# Patient Record
Sex: FEMALE | Employment: FULL TIME | URBAN - METROPOLITAN AREA
[De-identification: names, ages, dates, MRNs, and addresses within clinical notes are randomized per-mention and may not be internally consistent; named-entity substitution may affect disease eponyms.]

---

## 2017-04-24 ENCOUNTER — HOSPITAL ENCOUNTER (EMERGENCY)
Facility: CLINIC | Age: 62
Discharge: HOME | End: 2017-04-24
Attending: CLINICAL NURSE SPECIALIST | Admitting: CLINICAL NURSE SPECIALIST

## 2017-04-24 ENCOUNTER — APPOINTMENT (OUTPATIENT)
Dept: GENERAL RADIOLOGY | Facility: CLINIC | Age: 62
End: 2017-04-24
Attending: CLINICAL NURSE SPECIALIST

## 2017-04-24 VITALS
SYSTOLIC BLOOD PRESSURE: 142 MMHG | OXYGEN SATURATION: 96 % | WEIGHT: 190 LBS | BODY MASS INDEX: 43.97 KG/M2 | DIASTOLIC BLOOD PRESSURE: 62 MMHG | RESPIRATION RATE: 16 BRPM | HEART RATE: 94 BPM | HEIGHT: 55 IN | TEMPERATURE: 98.6 F

## 2017-04-24 DIAGNOSIS — M25.561 ACUTE PAIN OF RIGHT KNEE: ICD-10-CM

## 2017-04-24 PROCEDURE — 73560 X-RAY EXAM OF KNEE 1 OR 2: CPT | Mod: RT

## 2017-04-24 PROCEDURE — 29505 APPLICATION LONG LEG SPLINT: CPT | Mod: RT

## 2017-04-24 PROCEDURE — 99284 EMERGENCY DEPT VISIT MOD MDM: CPT | Mod: 25

## 2017-04-24 RX ORDER — HYDROCODONE BITARTRATE AND ACETAMINOPHEN 5; 325 MG/1; MG/1
1 TABLET ORAL EVERY 6 HOURS PRN
Qty: 16 TABLET | Refills: 0 | Status: ON HOLD | OUTPATIENT
Start: 2017-04-24 | End: 2021-09-10

## 2017-04-24 ASSESSMENT — ENCOUNTER SYMPTOMS
WOUND: 0
NUMBNESS: 0
COLOR CHANGE: 0
NAUSEA: 0
ARTHRALGIAS: 1
WEAKNESS: 0

## 2017-04-24 NOTE — ED AVS SNAPSHOT
Emergency Department    [address] [facility] [address]    Phone:  [phone]    Fax:  [phone]                                       Irma Linn   MRN: [id number]    Department:   Emergency Department   Date of Visit:  4/24/2017           Patient Information     Date Of Birth          1955        Your diagnoses for this visit were:     Acute pain of right knee        You were seen by Iraida Hughes, APRN CNP.      Follow-up Information     Follow up with [facility] In 4 days.    Contact information:    [address] [place] [address]  [phone]          Discharge Instructions         Dolor En La Rodilla [Knee Pain, Uncertain Cause]    La rodilla puede doler por varias causas comunes. Puede tratarse de un esguince (distención [sprain]) de los ligamentos que sostienen la articulación; de constantin lesión en el recubrimiento cartilaginoso que cubre la articulación; de artritis (arthritis) provocada por el desgaste de la articulación; de inflamación (inflammation); o de alguna otra causa. También puede liliana hinchazón, howard movimiento en la articulación de la rodilla y dolor al caminar. Hoy no pudimos realizar un diagnóstico definitivo. Si los síntomas no se alivian, es posible que haya que hacer otras pruebas y controles.  Cuidados En La [place]:  1. En lo posible, no se toque ni use la pierna lesionada hasta que disminuya el dolor.  2. Coloque constantin compresa de hielo (cubos de hielo en constantin bolsa plástica, envuelta en constantin toalla) sobre la bob afectada jessica 20 minutos cada 1 ó 2 horas el primer día. Siga aplicándose compresas de hielo de 3 a 4 veces al día los próximos dos días y, luego, según lo necesite para aliviar el dolor y la hinchazón.  3. Puede usar acetaminofén (acetaminophen) (Tylenol) o ibuprofeno (ibuprofen) (Motrin o Advil) para controlar el dolor, a menos que le hayan recetado otro calmante (analgésico [pain medicine]). [NOTA: Si tiene constantin  enfermedad hepática o renal crónica (chronic liver or kidney disease), o ha tenido alguna vez constantin úlcera estomacal (stomach ulcer) o sangrado gastrointestinal (GI bleeding), consulte con sanders médico antes de lyubov estos medicamentos.]  4. Si le recomendaron utilizar MULETAS (CRUTCHES) o constantin andadera (walker), no apoye todo el peso sobre la pierna lesionada hasta que pueda hacerlo sin sentir dolor. Consulte con el médico antes de volver a hacer deporte o regresar plenamente a odin tareas laborales.  5. Si le colocaron un INMOVILIZADOR DE RODILLA CON VELCRO (VELCRO KNEE BRACE):    Puede abrir el inmovilizador (soporte) para colocar hielo.    Puede quitarse el inmovilizador (soporte) para bañarse y dormir, a menos que le indiquen otra cosa diferente.  Programe constantin VISITA DE CONTROL con sanders médico dentro de 1 ó 2 semanas, o según le hayan indicado si no se siente mejor.  [NOTA: Si le finn hecho radiografías (X-rays), éstas serán evaluadas por un especialista. Le informarán de los nuevos hallazgos que puedan afectar la atención médica que necesita.]  Busque Prontamente Atención Médica  si algo de lo siguiente ocurre:    Se le hinchan los pies o los dedos de los pies, o los siente fríos o entumecidos, se shai azulados, o siente cosquilleo en mary bob.    Dolor o hinchazón que aumenta en la rodilla o la pantorrilla.    Siente calor o aparece enrojecimiento en la rodilla o la pantorrilla.    Siente dolor en otras articulaciones.    Se presenta fiebre o constantin erupción cutánea (salpullido [rash]).    Dolor en el pecho o falta de aire.    Fiebre de 100.4 F (38 C) o más estelle, o viky le haya indicado sanders proveedor de atención médica.    4620-3830 [employer]. [address] [place] [address]. Todos los derechos reservados. Esta información no pretende sustituir la atención médica profesional. Sólo sanders médico puede diagnosticar y tratar un problema de karan.          Knee Pain of Uncertain Cause    There are several  common causes for knee pain. These can include:    A sprain of the ligaments that support the joint    An injury to the cartilage lining of the joint    Arthritis from wear-and-tear or inflammation  There are other causes as well. There may also be swelling, reduced movement of the knee joint, and pain with walking. A definite diagnosis will still need to be made. If your symptoms do not improve, further follow-up and testing may be needed.  Home care    Stay off the injured leg as much as possible until pain improves.    Apply an ice pack over the injured area for 15 to 20 minutes every 3 to 6 hours. You should do this for the first 24 to 48 hours. You can make an ice pack by filling a plastic bag that seals at the top with ice cubes and then wrapping it with a thin towel. Continue to use ice packs for relief of pain and swelling as needed. As the ice melts, be careful to avoid getting your wrap, splint, or cast wet. After 48 hours, apply heat (warm shower or warm bath) for 15 to 20 minutes several times a day, or alternate ice and heat. If you have to wear a hook-and-loop knee brace, you can open it to apply the ice pack, or heat, directly to the knee. Never put ice directly on the skin. Always wrap the ice in a towel or other type of cloth.    You may use over-the-counter pain medicine to control pain, unless another pain medicine was prescribed. If you have chronic liver or kidney disease or ever had a stomach ulcer or GI bleeding, talk with your healthcare provider using these medicines.    If crutches or a walker have been recommended, do not put weight on the injured leg until you can do so without pain. Check with your healthcare provider before returning to sports or full work duties.    If you have a hook-and-loop knee brace, you can remove it to bathe and sleep, unless told otherwise.  Follow-up care  Follow up with your healthcare provider as advised. This is usually within 1-2 weeks.  If X-rays were  taken, you will be told of any new findings that may affect your care.  Call 911  Call 911 if you have:     Shortness of breath    Chest pain  When to seek medical advice  Call your healthcare provider right away if any of these occur:    Toes or foot becomes swollen, cold, blue, numb, or tingly    Pain or swelling spreads over the knee or calf    Warmth or redness appears over the knee or calf    Other joints become painful    Rash appears    Fever of 100.4 F (38 C) or above lasting for 24 to 48 hours    7961-3695 [employer]. [address] [place] [address]. All rights reserved. This information is not intended as a substitute for professional medical care. Always follow your healthcare professional's instructions.          24 Hour Appointment Hotline       To make an appointment at any [facility], call [phone] ([phone]). If you don't have a family doctor or clinic, we will help you find one. [facility] are conveniently located to serve the needs of you and your family.             Review of your medicines      START taking        Dose / Directions Last dose taken    HYDROcodone-acetaminophen 5-325 MG per tablet   Commonly known as:  NORCO   Dose:  1 tablet   Quantity:  16 tablet        Take 1 tablet by mouth every 6 hours as needed for pain   Refills:  0          Our records show that you are taking the medicines listed below. If these are incorrect, please call your family doctor or clinic.        Dose / Directions Last dose taken    NOVOLOG SC        Refills:  0                Prescriptions were sent or printed at these locations (1 Prescription)                   Other Prescriptions                Printed at Department/Unit printer (1 of 1)         HYDROcodone-acetaminophen (NORCO) 5-325 MG per tablet                Procedures and tests performed during your visit     XR Knee Right 1/2 Views      Orders Needing Specimen Collection     None      Pending Results      No orders found from 4/22/2017 to 4/25/2017.            Pending Culture Results     No orders found from 4/22/2017 to 4/25/2017.            Test Results From Your Hospital Stay              4/24/2017  7:39 PM      Narrative     XR KNEE RT 1 /2 VW   4/24/2017 7:37 PM     HISTORY: Right knee pain.     COMPARISON: None.        Impression     IMPRESSION: Normal.    TIAGO CRUZ MD                Clinical Quality Measure: Blood Pressure Screening     Your blood pressure was checked while you were in the emergency department today. The last reading we obtained was  BP: 142/62 . Please read the guidelines below about what these numbers mean and what you should do about them.  If your systolic blood pressure (the top number) is less than 120 and your diastolic blood pressure (the bottom number) is less than 80, then your blood pressure is normal. There is nothing more that you need to do about it.  If your systolic blood pressure (the top number) is 120-139 or your diastolic blood pressure (the bottom number) is 80-89, your blood pressure may be higher than it should be. You should have your blood pressure rechecked within a year by a primary care provider.  If your systolic blood pressure (the top number) is 140 or greater or your diastolic blood pressure (the bottom number) is 90 or greater, you may have high blood pressure. High blood pressure is treatable, but if left untreated over time it can put you at risk for heart attack, stroke, or kidney failure. You should have your blood pressure rechecked by a primary care provider within the next 4 weeks.  If your provider in the emergency department today gave you specific instructions to follow-up with your doctor or provider even sooner than that, you should follow that instruction and not wait for up to 4 weeks for your follow-up visit.        Thank you for choosing [facility]       Thank you for choosing [facility] for your care. Our goal is always to provide you with  "excellent care. Hearing back from our patients is one way we can continue to improve our services. Please take a few minutes to complete the written survey that you may receive in the mail after you visit with us. Thank you!        [employer] Information     [employer] lets you send messages to your doctor, view your test results, renew your prescriptions, schedule appointments and more. To sign up, go to [email or web address] . Click on \"Log in\" on the left side of the screen, which will take you to the Welcome page. Then click on \"Sign up Now\" on the right side of the page.     You will be asked to enter the access code listed below, as well as some personal information. Please follow the directions to create your username and password.     Your access code is: [email or web address]  Expires: 2017  7:59 PM     Your access code will  in 90 days. If you need help or a new code, please call your [facility] clinic or [phone].        Care EveryWhere ID     This is your Care EveryWhere ID. This could be used by other organizations to access your [facility] medical records  [id number]        After Visit Summary       This is your record. Keep this with you and show to your community pharmacist(s) and doctor(s) at your next visit.                  "

## 2017-04-24 NOTE — ED AVS SNAPSHOT
Emergency Department    [address] [place] [address]    Phone:  [phone]    Fax:  [phone]                                       Irma Linn   MRN: [id number]    Department:   Emergency Department   Date of Visit:  4/24/2017           After Visit Summary Signature Page     I have received my discharge instructions, and my questions have been answered. I have discussed any challenges I see with this plan with the nurse or doctor.    ..........................................................................................................................................  Patient/Patient Representative Signature      ..........................................................................................................................................  Patient Representative Print Name and Relationship to Patient    ..................................................               ................................................  Date                                            Time    ..........................................................................................................................................  Reviewed by Signature/Title    ...................................................              ..............................................  Date                                                            Time

## 2017-04-25 NOTE — ED PROVIDER NOTES
"  History     Chief Complaint:  Right knee pain    HPI     History provided via  phone as the patient speaks [place] as a primary language.     Irma Linn is a 61 year old female who presents with right knee pain. The patient states that approximately 3 days ago she developed atraumatic right knee pain that was initially mild. She applied ice and other home remedies with minimal improvement. Her pain has been progressively worsened and developed into a strong burning pain that made it difficult to walk or bear weight prompting her to come here for evaluation. Here she localizes the pain in her inferoanterior right knee. She was taking Tylenol at home with no relief. She denies any injury, wounds, redness, or swelling.     Blood glucose normally runs between 100 to 200.    Allergies:  No known drug allergies     Medications:    Insulin (Novolog 17 units at night)    Past Medical History:    Diabetes     Past Surgical History:    History reviewed. No pertinent surgical history.     Family History:    History reviewed. No pertinent family history.      Social History:  Smoking status: Former smoker  Alcohol use: No   Marital Status:       Review of Systems   Gastrointestinal: Negative for nausea.   Musculoskeletal: Positive for arthralgias.   Skin: Negative for color change and wound.   Neurological: Negative for weakness and numbness.       Physical Exam   First Vitals:  BP: 142/62  Pulse: 94  Temp: 98.6  F (37  C)  Resp: 16  Height: 124.5 cm (4' 1\")  Weight: 86.2 kg (190 lb)      Physical Exam  Constitutional: Pt appears well-developed and well-nourished. Non toxic appearing.   ENT: Oropharynx is moist.   Eyes: EOMs intact. Pupils are equal, round, and reactive to light.    Cardiovascular: Regular rate and rhythm.   Pulmonary/Chest: No respiratory distress.     Musc: Pain over the right MCL and lower medial joint line of the right knee. She is able to flex and extend the knee with " discomfort. There is no pain in the hip or ankle. No redness, swelling, or warmth. No pedal edema. Distal CMS is intact in the right lower extremity.   Neurological: No focal deficits.   Skin: Skin is warm, dry.       Emergency Department Course     Imaging:  Radiographic findings were communicated with the patient who voiced understanding of the findings.    X-ray Right knee, 1/2 views:  Normal.  Result per radiology.      Emergency Department Course:  Past medical records, nursing notes, and vitals reviewed.  1909: I performed an exam of the patient and obtained history, as documented above.   The patient was sent for a X-ray while in the emergency department, findings above.   I rechecked the patient. Findings and plan explained to the Patient. Patient discharged home with instructions regarding supportive care, medications, and reasons to return. The importance of close follow-up was reviewed.      Impression & Plan      Medical Decision Making:    Irma Linn is a 61 year old female presents for evaluation after right knee pain.  Signs and symptoms are consistent with a sprain.  A broad differential was considered including sprain, strain, fracture, tendon rupture, nerve impingement/compromise, referred pain,  septic joint. Supportive outpatient management is indicated. Normal xray.  No evidence of cellulitis or septic joint on exam.  Rest, ice, and elevation treatment was discussed with the patient. She received a knee immobilizer and walker.  Close follow-up with orthopedics per discharge precautions.   Strain discharge instructions given for home.     Diagnosis:    ICD-10-CM    1. Acute pain of right knee M25.561      Discharge Medications:   Details   HYDROcodone-acetaminophen (NORCO) 5-325 MG per tablet Take 1 tablet by mouth every 6 hours as needed for pain, Disp-16 tablet, R-0, Local Print       García Rizvi  4/24/2017    EMERGENCY DEPARTMENT  I, García Rizvi, am serving as a scribe at  7:08 PM on 4/24/2017 to document services personally performed by Iraida Hughes, NP based on my observations and the provider's statements to me.       Iraida Hughes, APRN CNP  04/24/17 2152

## 2017-04-25 NOTE — DISCHARGE INSTRUCTIONS
Dolor En La Rodilla [Knee Pain, Uncertain Cause]    La rodilla puede doler por varias causas comunes. Puede tratarse de un esguince (distención [sprain]) de los ligamentos que sostienen la articulación; de constantin lesión en el recubrimiento cartilaginoso que cubre la articulación; de artritis (arthritis) provocada por el desgaste de la articulación; de inflamación (inflammation); o de alguna otra causa. También puede liliana hinchazón, howard movimiento en la articulación de la rodilla y dolor al caminar. Hoy no pudimos realizar un diagnóstico definitivo. Si los síntomas no se alivian, es posible que haya que hacer otras pruebas y controles.  Cuidados En La [place]:  1. En lo posible, no se toque ni use la pierna lesionada hasta que disminuya el dolor.  2. Coloque constantin compresa de hielo (cubos de hielo en constantin bolsa plástica, envuelta en constantin toalla) sobre la bob afectada jessica 20 minutos cada 1 ó 2 horas el primer día. Siga aplicándose compresas de hielo de 3 a 4 veces al día los próximos dos días y, luego, según lo necesite para aliviar el dolor y la hinchazón.  3. Puede usar acetaminofén (acetaminophen) (Tylenol) o ibuprofeno (ibuprofen) (Motrin o Advil) para controlar el dolor, a menos que le hayan recetado otro calmante (analgésico [pain medicine]). [NOTA: Si tiene constantin enfermedad hepática o renal crónica (chronic liver or kidney disease), o ha tenido alguna vez constantin úlcera estomacal (stomach ulcer) o sangrado gastrointestinal (GI bleeding), consulte con sanders médico antes de lyubov estos medicamentos.]  4. Si le recomendaron utilizar MULETAS (CRUTCHES) o constantin andadera (walker), no apoye todo el peso sobre la pierna lesionada hasta que pueda hacerlo sin sentir dolor. Consulte con el médico antes de volver a hacer deporte o regresar plenamente a odin tareas laborales.  5. Si le colocaron un INMOVILIZADOR DE RODILLA CON VELCRO (VELCRO KNEE BRACE):    Puede abrir el inmovilizador (soporte) para colocar hielo.    Puede quitarse el  inmovilizador (soporte) para bañarse y dormir, a menos que le indiquen otra cosa diferente.  Programe constantin VISITA DE CONTROL con sanders médico dentro de 1 ó 2 semanas, o según le hayan indicado si no se siente mejor.  [NOTA: Si le finn hecho radiografías (X-rays), éstas serán evaluadas por un especialista. Le informarán de los nuevos hallazgos que puedan afectar la atención médica que necesita.]  Busque Prontamente Atención Médica  si algo de lo siguiente ocurre:    Se le hinchan los pies o los dedos de los pies, o los siente fríos o entumecidos, se shai azulados, o siente cosquilleo en mary bob.    Dolor o hinchazón que aumenta en la rodilla o la pantorrilla.    Siente calor o aparece enrojecimiento en la rodilla o la pantorrilla.    Siente dolor en otras articulaciones.    Se presenta fiebre o constantin erupción cutánea (salpullido [rash]).    Dolor en el pecho o falta de aire.    Fiebre de 100.4 F (38 C) o más estelle, o viky le haya indicado sanders proveedor de atención médica.    5811-0083 [employer]. [address] [place] [address]. Todos los derechos reservados. Esta información no pretende sustituir la atención médica profesional. Sólo sanders médico puede diagnosticar y tratar un problema de karan.          Knee Pain of Uncertain Cause    There are several common causes for knee pain. These can include:    A sprain of the ligaments that support the joint    An injury to the cartilage lining of the joint    Arthritis from wear-and-tear or inflammation  There are other causes as well. There may also be swelling, reduced movement of the knee joint, and pain with walking. A definite diagnosis will still need to be made. If your symptoms do not improve, further follow-up and testing may be needed.  Home care    Stay off the injured leg as much as possible until pain improves.    Apply an ice pack over the injured area for 15 to 20 minutes every 3 to 6 hours. You should do this for the first 24 to 48 hours. You  can make an ice pack by filling a plastic bag that seals at the top with ice cubes and then wrapping it with a thin towel. Continue to use ice packs for relief of pain and swelling as needed. As the ice melts, be careful to avoid getting your wrap, splint, or cast wet. After 48 hours, apply heat (warm shower or warm bath) for 15 to 20 minutes several times a day, or alternate ice and heat. If you have to wear a hook-and-loop knee brace, you can open it to apply the ice pack, or heat, directly to the knee. Never put ice directly on the skin. Always wrap the ice in a towel or other type of cloth.    You may use over-the-counter pain medicine to control pain, unless another pain medicine was prescribed. If you have chronic liver or kidney disease or ever had a stomach ulcer or GI bleeding, talk with your healthcare provider using these medicines.    If crutches or a walker have been recommended, do not put weight on the injured leg until you can do so without pain. Check with your healthcare provider before returning to sports or full work duties.    If you have a hook-and-loop knee brace, you can remove it to bathe and sleep, unless told otherwise.  Follow-up care  Follow up with your healthcare provider as advised. This is usually within 1-2 weeks.  If X-rays were taken, you will be told of any new findings that may affect your care.  Call 911  Call 911 if you have:     Shortness of breath    Chest pain  When to seek medical advice  Call your healthcare provider right away if any of these occur:    Toes or foot becomes swollen, cold, blue, numb, or tingly    Pain or swelling spreads over the knee or calf    Warmth or redness appears over the knee or calf    Other joints become painful    Rash appears    Fever of 100.4 F (38 C) or above lasting for 24 to 48 hours    5785-6897 [employer]. [address] [place] [address], [place] [address]. All rights reserved. This information is not intended as a substitute for  professional medical care. Always follow your healthcare professional's instructions.

## 2020-01-15 ENCOUNTER — HOSPITAL ENCOUNTER (EMERGENCY)
Facility: CLINIC | Age: 65
Discharge: HOME | End: 2020-01-15
Attending: PHYSICIAN ASSISTANT | Admitting: PHYSICIAN ASSISTANT

## 2020-01-15 ENCOUNTER — APPOINTMENT (OUTPATIENT)
Dept: GENERAL RADIOLOGY | Facility: CLINIC | Age: 65
End: 2020-01-15
Attending: PHYSICIAN ASSISTANT

## 2020-01-15 VITALS
RESPIRATION RATE: 16 BRPM | HEART RATE: 80 BPM | SYSTOLIC BLOOD PRESSURE: 140 MMHG | DIASTOLIC BLOOD PRESSURE: 88 MMHG | TEMPERATURE: 97.9 F | OXYGEN SATURATION: 97 %

## 2020-01-15 DIAGNOSIS — S89.91XA KNEE INJURY, RIGHT, INITIAL ENCOUNTER: ICD-10-CM

## 2020-01-15 PROCEDURE — 73610 X-RAY EXAM OF ANKLE: CPT | Mod: RT

## 2020-01-15 PROCEDURE — 73502 X-RAY EXAM HIP UNI 2-3 VIEWS: CPT

## 2020-01-15 PROCEDURE — 99285 EMERGENCY DEPT VISIT HI MDM: CPT

## 2020-01-15 PROCEDURE — 25000132 ZZH RX MED GY IP 250 OP 250 PS 637: Performed by: EMERGENCY MEDICINE

## 2020-01-15 PROCEDURE — 25000132 ZZH RX MED GY IP 250 OP 250 PS 637: Performed by: PHYSICIAN ASSISTANT

## 2020-01-15 PROCEDURE — 73562 X-RAY EXAM OF KNEE 3: CPT | Mod: RT

## 2020-01-15 RX ORDER — IBUPROFEN 600 MG/1
600 TABLET, FILM COATED ORAL ONCE
Status: COMPLETED | OUTPATIENT
Start: 2020-01-15 | End: 2020-01-15

## 2020-01-15 RX ORDER — HYDROCODONE BITARTRATE AND ACETAMINOPHEN 5; 325 MG/1; MG/1
1 TABLET ORAL EVERY 6 HOURS PRN
Qty: 8 TABLET | Refills: 0 | Status: SHIPPED | OUTPATIENT
Start: 2020-01-15 | End: 2020-01-18

## 2020-01-15 RX ORDER — ACETAMINOPHEN 500 MG
1000 TABLET ORAL ONCE
Status: COMPLETED | OUTPATIENT
Start: 2020-01-15 | End: 2020-01-15

## 2020-01-15 RX ORDER — OXYCODONE HYDROCHLORIDE 5 MG/1
5 TABLET ORAL ONCE
Status: COMPLETED | OUTPATIENT
Start: 2020-01-15 | End: 2020-01-15

## 2020-01-15 RX ADMIN — ACETAMINOPHEN 1000 MG: 500 TABLET, FILM COATED ORAL at 20:25

## 2020-01-15 RX ADMIN — OXYCODONE HYDROCHLORIDE 5 MG: 5 TABLET ORAL at 22:56

## 2020-01-15 RX ADMIN — IBUPROFEN 600 MG: 600 TABLET ORAL at 21:26

## 2020-01-15 ASSESSMENT — ENCOUNTER SYMPTOMS
NECK PAIN: 0
HEADACHES: 0
ARTHRALGIAS: 1

## 2020-01-15 NOTE — ED AVS SNAPSHOT
Emergency Department  [address] [place] [id number]  Phone:  [phone]  Fax:  [phone]                                    Irma Anglin   MRN: [id number]    Department:   Emergency Department   Date of Visit:  1/15/2020           After Visit Summary Signature Page    I have received my discharge instructions, and my questions have been answered. I have discussed any challenges I see with this plan with the nurse or doctor.    ..........................................................................................................................................  Patient/Patient Representative Signature      ..........................................................................................................................................  Patient Representative Print Name and Relationship to Patient    ..................................................               ................................................  Date                                   Time    ..........................................................................................................................................  Reviewed by Signature/Title    ...................................................              ..............................................  Date                                               Time          22EPIC Rev 08/18

## 2020-01-16 NOTE — ED PROVIDER NOTES
History     Chief Complaint:  Knee Injury    The history is provided by the patient. No  was used (Family provides translation).      Irma Anglin is a 64 year old female, with the below past medical history, who presents with family for evaluation of right knee pain following a mechanical fall while at work. Patient states she slipped on water and fell, doing sort of the splits, then falling backwards as she was unable to stand. Due to pain, she was prompted to present.     The patient complains of pain in her right knee, which shoots down her leg and up into her hip.  She denies blood thinner use.  She states she has some tingling in the leg as well.  She has been able to ambulate since but it is been painful.      Allergies:  No Known Drug Allergies     Medications:    Novolog    Past Medical History:    Diabetes  GERD    Past Surgical History:    Abdomen surgery     Family History:    History reviewed. No pertinent family history.       Social History:  The patient was accompanied to the ED by family.  Smoking Status: Former  Alcohol Use: No  Drug Use: No   Marital Status:   [2]     Review of Systems   Genitourinary: Positive for pelvic pain.   Musculoskeletal: Positive for arthralgias. Negative for neck pain.   Neurological: Negative for headaches.   All other systems reviewed and are negative.    Physical Exam     Patient Vitals for the past 24 hrs:   BP Temp Temp src Pulse Heart Rate Resp SpO2   01/15/20 2310 (!) 140/88 -- -- 80 -- -- --   01/15/20 2011 (!) 149/67 97.9  F (36.6  C) Oral -- 84 16 97 %       Physical Exam  General: Alert and oriented.    Head: Normocephalic.  External ears and nose normal.    Eyes: Pupils equal and round.  Normal tracking.    Pulmonary/Chest: Effort and rate normal.  No peripheral edema.    MSK:  Focused exam of the right knee shows no obvious effusion or redness.  Pain with ROM.  Diffuse bony tenderness over the patella, fibular head  or tibial plateau.  Normal tracking of the patella with quadriceps and patella tendons intact on knee extension with straight leg raise.  No gross ligamentous laxity.  Pain with movement at hip and ankle, with mild diffuse tenderness.  Compartments soft, no pain with passive movement of hallux.  No tenderness over midline spinous processes.    SKIN:  Warm and dry with strong DP,PT pulse and normal capillary refill. No rashes or crepitance.    NEURO:  Normal sensation throughout the foot and ankle.  Strength limited at knee secondary to pain. No foot drop.    PSYCH:  Normal affect      Emergency Department Course     Imaging:  Radiology findings were communicated with the patient and family who voiced understanding of the findings.    XR Pelvis w Hip Right 1 View   Final Result   IMPRESSION: No visible fracture. No dislocation right hip.      Ankle XR, G/E 3 views, right   Final Result   IMPRESSION: No visible fracture or dislocation. Plantar calcaneal spur and mild enthesopathy Achilles insertion.      XR Knee Right 3 Views   Final Result   IMPRESSION: Small joint effusion. No fracture identified.       WES BROWNE MD          Interventions:  Medications   acetaminophen (TYLENOL) tablet 1,000 mg (1,000 mg Oral Given 1/15/20 2025)   ibuprofen (ADVIL/MOTRIN) tablet 600 mg (600 mg Oral Given 1/15/20 2126)   oxyCODONE (ROXICODONE) tablet 5 mg (5 mg Oral Given 1/15/20 2256)       Emergency Department Course:  Past medical records, nursing notes, and vitals reviewed.    The patient was sent for a XR Knee right while in the emergency department, results above.     (2113)   I performed an exam of the patient as documented above. History obtained from patient with family translating. Discussed results of x-ray.     The patient was sent for a Ankle XR Right, XR Hip Right while in the emergency department, results above.       I rechecked the patient and discussed the results of her workup thus far. Patient will be placed in  knee immobilizer and plan of care discussed. Patient will be discharged.     Findings and plan explained to the Patient and family. Patient discharged home with instructions regarding supportive care, medications, and reasons to return. The importance of close follow-up was reviewed.     I personally reviewed the imaging results with the Patient and family and answered all related questions prior to discharge.     Impression & Plan     Medical Decision Makin year old female presents with right knee pain.  Patient history and records reviewed.  Broad differential was considered.  Patient is well appearing with stable vitals.  X-rays were obtained which demonstrates no evidence of fracture or dislocation. X-rays of the hip and ankle were also obtained given radiation of pain, which were normal.  She has been able to bear weight  Neurovascular status is intact distally and no signs of compartment syndrome.  Head to toe trauma exam otherwise non-concerning.    I suspect ligamentous injury is most likely given exam, mechanism, and small effusion seen on x-ray.  Exam today does not demonstrate significant ligamentous laxity or concerning findings to suggest occult knee dislocation.  Patellar and quadriceps tendons are grossly intact on exam.  Evaluation is not consistent with pain from DVT and patient is very low risk for this.    The patient was able to bear weight but states it was very painful.  Doubt occult hip fracture.  Given significant pain with movement, the patient was given a knee immobilizer and crutches, and instructed to follow-up with ortho.    Recommended conservative treatment with NSAIDS, ice, stretching and given short course of pain medication..  Discussed indications to return to ED if any new or worsening symptoms develop.    Diagnosis:    ICD-10-CM    1. Knee injury, right, initial encounter S89.91XA        Disposition:  Discharged to home.    Discharge Medications:  Discharge Medication List  as of 1/15/2020 11:10 PM      START taking these medications    Details   !! HYDROcodone-acetaminophen (NORCO) 5-325 MG tablet Take 1 tablet by mouth every 6 hours as needed for severe pain, Disp-8 tablet, R-0, Local Print       !! - Potential duplicate medications found. Please discuss with provider.          Scribe Disclosure:  I, Savita Belle, am serving as a scribe at 8:37 PM on 1/15/2020 to document services personally performed by Neel Dunham PA-C based on my observations and the provider's statements to me.   1/15/2020    EMERGENCY DEPARTMENT       Neel Dunham PA-C  01/15/20 2861

## 2021-09-10 ENCOUNTER — HOSPITAL ENCOUNTER (INPATIENT)
Facility: CLINIC | Age: 66
LOS: 12 days | Discharge: HOME | End: 2021-09-22
Attending: EMERGENCY MEDICINE | Admitting: HOSPITALIST
Payer: MEDICAID

## 2021-09-10 ENCOUNTER — APPOINTMENT (OUTPATIENT)
Dept: CT IMAGING | Facility: CLINIC | Age: 66
End: 2021-09-10
Attending: EMERGENCY MEDICINE
Payer: MEDICAID

## 2021-09-10 DIAGNOSIS — E11.65 TYPE 2 DIABETES MELLITUS WITH HYPERGLYCEMIA, WITH LONG-TERM CURRENT USE OF INSULIN (H): ICD-10-CM

## 2021-09-10 DIAGNOSIS — J12.82 PNEUMONIA DUE TO 2019 NOVEL CORONAVIRUS: ICD-10-CM

## 2021-09-10 DIAGNOSIS — R09.02 HYPOXIA: ICD-10-CM

## 2021-09-10 DIAGNOSIS — Z79.4 TYPE 2 DIABETES MELLITUS WITH HYPERGLYCEMIA, WITH LONG-TERM CURRENT USE OF INSULIN (H): ICD-10-CM

## 2021-09-10 DIAGNOSIS — G47.00 INSOMNIA, UNSPECIFIED TYPE: Primary | ICD-10-CM

## 2021-09-10 DIAGNOSIS — U07.1 PNEUMONIA DUE TO 2019 NOVEL CORONAVIRUS: ICD-10-CM

## 2021-09-10 DIAGNOSIS — L02.31 CUTANEOUS ABSCESS OF BUTTOCK: ICD-10-CM

## 2021-09-10 LAB
ALBUMIN SERPL-MCNC: 2.9 G/DL (ref 3.4–5)
ALP SERPL-CCNC: 75 U/L (ref 40–150)
ALT SERPL W P-5'-P-CCNC: 76 U/L (ref 0–50)
ANION GAP SERPL CALCULATED.3IONS-SCNC: 8 MMOL/L (ref 3–14)
AST SERPL W P-5'-P-CCNC: 82 U/L (ref 0–45)
ATRIAL RATE - MUSE: 99 BPM
BASOPHILS # BLD MANUAL: 0 10E3/UL (ref 0–0.2)
BASOPHILS NFR BLD MANUAL: 0 %
BILIRUB SERPL-MCNC: 0.7 MG/DL (ref 0.2–1.3)
BUN SERPL-MCNC: 9 MG/DL (ref 7–30)
CALCIUM SERPL-MCNC: 8 MG/DL (ref 8.5–10.1)
CHLORIDE BLD-SCNC: 98 MMOL/L (ref 94–109)
CO2 SERPL-SCNC: 20 MMOL/L (ref 20–32)
CREAT BLD-MCNC: 0.3 MG/DL (ref 0.5–1)
CREAT BLD-MCNC: 0.3 MG/DL (ref 0.5–1.2)
CREAT SERPL-MCNC: 0.43 MG/DL (ref 0.52–1.04)
CRP SERPL-MCNC: 115 MG/L (ref 0–8)
D DIMER PPP FEU-MCNC: 1 UG/ML FEU (ref 0–0.5)
DIASTOLIC BLOOD PRESSURE - MUSE: NORMAL MMHG
EOSINOPHIL # BLD MANUAL: 0 10E3/UL (ref 0–0.7)
EOSINOPHIL NFR BLD MANUAL: 0 %
ERYTHROCYTE [DISTWIDTH] IN BLOOD BY AUTOMATED COUNT: 11.9 % (ref 10–15)
ERYTHROCYTE [SEDIMENTATION RATE] IN BLOOD BY WESTERGREN METHOD: 68 MM/HR (ref 0–30)
GFR SERPL CREATININE-BSD FRML MDRD: >60 ML/MIN/1.73M2
GFR SERPL CREATININE-BSD FRML MDRD: >90 ML/MIN/1.73M2
GLUCOSE BLD-MCNC: 353 MG/DL (ref 70–99)
GLUCOSE BLDC GLUCOMTR-MCNC: 413 MG/DL (ref 70–99)
GLUCOSE BLDC GLUCOMTR-MCNC: 434 MG/DL (ref 70–99)
HBA1C MFR BLD: 9.7 % (ref 0–5.6)
HCT VFR BLD AUTO: 38.5 % (ref 35–47)
HGB BLD-MCNC: 13.1 G/DL (ref 11.7–15.7)
INTERPRETATION ECG - MUSE: NORMAL
LACTATE SERPL-SCNC: 1.4 MMOL/L (ref 0.7–2)
LYMPHOCYTES # BLD MANUAL: 0.8 10E3/UL (ref 0.8–5.3)
LYMPHOCYTES NFR BLD MANUAL: 14 %
MCH RBC QN AUTO: 31.1 PG (ref 26.5–33)
MCHC RBC AUTO-ENTMCNC: 34 G/DL (ref 31.5–36.5)
MCV RBC AUTO: 91 FL (ref 78–100)
MONOCYTES # BLD MANUAL: 0.1 10E3/UL (ref 0–1.3)
MONOCYTES NFR BLD MANUAL: 2 %
NEUTROPHILS # BLD MANUAL: 4.9 10E3/UL (ref 1.6–8.3)
NEUTROPHILS NFR BLD MANUAL: 84 %
NT-PROBNP SERPL-MCNC: 32 PG/ML (ref 0–900)
P AXIS - MUSE: 28 DEGREES
PLAT MORPH BLD: NORMAL
PLATELET # BLD AUTO: 145 10E3/UL (ref 150–450)
POTASSIUM BLD-SCNC: 4.2 MMOL/L (ref 3.4–5.3)
PR INTERVAL - MUSE: 122 MS
PROCALCITONIN SERPL-MCNC: 0.13 NG/ML
PROT SERPL-MCNC: 8 G/DL (ref 6.8–8.8)
QRS DURATION - MUSE: 76 MS
QT - MUSE: 348 MS
QTC - MUSE: 446 MS
R AXIS - MUSE: 1 DEGREES
RBC # BLD AUTO: 4.21 10E6/UL (ref 3.8–5.2)
RBC MORPH BLD: NORMAL
SARS-COV-2 RNA RESP QL NAA+PROBE: POSITIVE
SODIUM SERPL-SCNC: 126 MMOL/L (ref 133–144)
SYSTOLIC BLOOD PRESSURE - MUSE: NORMAL MMHG
T AXIS - MUSE: 31 DEGREES
TROPONIN I SERPL-MCNC: <0.015 UG/L (ref 0–0.04)
VENTRICULAR RATE- MUSE: 99 BPM
WBC # BLD AUTO: 5.8 10E3/UL (ref 4–11)

## 2021-09-10 PROCEDURE — 85379 FIBRIN DEGRADATION QUANT: CPT | Performed by: EMERGENCY MEDICINE

## 2021-09-10 PROCEDURE — 250N000011 HC RX IP 250 OP 636: Performed by: EMERGENCY MEDICINE

## 2021-09-10 PROCEDURE — 250N000009 HC RX 250: Performed by: EMERGENCY MEDICINE

## 2021-09-10 PROCEDURE — 36415 COLL VENOUS BLD VENIPUNCTURE: CPT | Performed by: EMERGENCY MEDICINE

## 2021-09-10 PROCEDURE — 99223 1ST HOSP IP/OBS HIGH 75: CPT | Mod: AI | Performed by: HOSPITALIST

## 2021-09-10 PROCEDURE — 85652 RBC SED RATE AUTOMATED: CPT | Performed by: EMERGENCY MEDICINE

## 2021-09-10 PROCEDURE — 250N000011 HC RX IP 250 OP 636: Performed by: HOSPITALIST

## 2021-09-10 PROCEDURE — 87040 BLOOD CULTURE FOR BACTERIA: CPT | Performed by: EMERGENCY MEDICINE

## 2021-09-10 PROCEDURE — C9803 HOPD COVID-19 SPEC COLLECT: HCPCS

## 2021-09-10 PROCEDURE — 83036 HEMOGLOBIN GLYCOSYLATED A1C: CPT | Performed by: HOSPITALIST

## 2021-09-10 PROCEDURE — 96375 TX/PRO/DX INJ NEW DRUG ADDON: CPT

## 2021-09-10 PROCEDURE — 85027 COMPLETE CBC AUTOMATED: CPT | Performed by: EMERGENCY MEDICINE

## 2021-09-10 PROCEDURE — 99285 EMERGENCY DEPT VISIT HI MDM: CPT | Mod: 25

## 2021-09-10 PROCEDURE — 82565 ASSAY OF CREATININE: CPT

## 2021-09-10 PROCEDURE — 250N000009 HC RX 250: Performed by: HOSPITALIST

## 2021-09-10 PROCEDURE — 250N000012 HC RX MED GY IP 250 OP 636 PS 637: Performed by: HOSPITALIST

## 2021-09-10 PROCEDURE — 83605 ASSAY OF LACTIC ACID: CPT | Performed by: EMERGENCY MEDICINE

## 2021-09-10 PROCEDURE — 86140 C-REACTIVE PROTEIN: CPT | Performed by: EMERGENCY MEDICINE

## 2021-09-10 PROCEDURE — 87635 SARS-COV-2 COVID-19 AMP PRB: CPT | Performed by: EMERGENCY MEDICINE

## 2021-09-10 PROCEDURE — 84145 PROCALCITONIN (PCT): CPT | Performed by: EMERGENCY MEDICINE

## 2021-09-10 PROCEDURE — 120N000001 HC R&B MED SURG/OB

## 2021-09-10 PROCEDURE — 250N000009 HC RX 250

## 2021-09-10 PROCEDURE — 999N000157 HC STATISTIC RCP TIME EA 10 MIN

## 2021-09-10 PROCEDURE — 84484 ASSAY OF TROPONIN QUANT: CPT | Performed by: EMERGENCY MEDICINE

## 2021-09-10 PROCEDURE — 93005 ELECTROCARDIOGRAM TRACING: CPT

## 2021-09-10 PROCEDURE — 96365 THER/PROPH/DIAG IV INF INIT: CPT

## 2021-09-10 PROCEDURE — 71275 CT ANGIOGRAPHY CHEST: CPT

## 2021-09-10 PROCEDURE — 83880 ASSAY OF NATRIURETIC PEPTIDE: CPT | Performed by: EMERGENCY MEDICINE

## 2021-09-10 PROCEDURE — 258N000003 HC RX IP 258 OP 636: Performed by: EMERGENCY MEDICINE

## 2021-09-10 PROCEDURE — 96361 HYDRATE IV INFUSION ADD-ON: CPT

## 2021-09-10 PROCEDURE — 80053 COMPREHEN METABOLIC PANEL: CPT | Performed by: EMERGENCY MEDICINE

## 2021-09-10 RX ORDER — ACETAMINOPHEN 650 MG/1
650 SUPPOSITORY RECTAL EVERY 4 HOURS PRN
Status: DISCONTINUED | OUTPATIENT
Start: 2021-09-10 | End: 2021-09-16

## 2021-09-10 RX ORDER — PIOGLITAZONEHYDROCHLORIDE 45 MG/1
45 TABLET ORAL DAILY
COMMUNITY

## 2021-09-10 RX ORDER — NICOTINE POLACRILEX 4 MG
15-30 LOZENGE BUCCAL
Status: DISCONTINUED | OUTPATIENT
Start: 2021-09-10 | End: 2021-09-15

## 2021-09-10 RX ORDER — DEXTROSE MONOHYDRATE 25 G/50ML
25-50 INJECTION, SOLUTION INTRAVENOUS
Status: DISCONTINUED | OUTPATIENT
Start: 2021-09-10 | End: 2021-09-15

## 2021-09-10 RX ORDER — ACETAMINOPHEN 325 MG/1
650 TABLET ORAL EVERY 4 HOURS PRN
Status: DISCONTINUED | OUTPATIENT
Start: 2021-09-10 | End: 2021-09-22 | Stop reason: HOSPADM

## 2021-09-10 RX ORDER — IOPAMIDOL 755 MG/ML
70 INJECTION, SOLUTION INTRAVASCULAR ONCE
Status: COMPLETED | OUTPATIENT
Start: 2021-09-10 | End: 2021-09-10

## 2021-09-10 RX ORDER — LIDOCAINE 40 MG/G
CREAM TOPICAL
Status: DISCONTINUED | OUTPATIENT
Start: 2021-09-10 | End: 2021-09-22 | Stop reason: HOSPADM

## 2021-09-10 RX ORDER — DEXAMETHASONE SODIUM PHOSPHATE 4 MG/ML
6 INJECTION, SOLUTION INTRA-ARTICULAR; INTRALESIONAL; INTRAMUSCULAR; INTRAVENOUS; SOFT TISSUE ONCE
Status: COMPLETED | OUTPATIENT
Start: 2021-09-10 | End: 2021-09-10

## 2021-09-10 RX ORDER — IPRATROPIUM BROMIDE AND ALBUTEROL SULFATE 2.5; .5 MG/3ML; MG/3ML
SOLUTION RESPIRATORY (INHALATION)
Status: COMPLETED
Start: 2021-09-10 | End: 2021-09-10

## 2021-09-10 RX ORDER — IPRATROPIUM BROMIDE AND ALBUTEROL SULFATE 2.5; .5 MG/3ML; MG/3ML
3 SOLUTION RESPIRATORY (INHALATION) ONCE
Status: COMPLETED | OUTPATIENT
Start: 2021-09-10 | End: 2021-09-10

## 2021-09-10 RX ORDER — ROSUVASTATIN CALCIUM 20 MG/1
20 TABLET, COATED ORAL DAILY
COMMUNITY

## 2021-09-10 RX ADMIN — IPRATROPIUM BROMIDE AND ALBUTEROL 2 PUFF: 20; 100 SPRAY, METERED RESPIRATORY (INHALATION) at 22:36

## 2021-09-10 RX ADMIN — DEXAMETHASONE SODIUM PHOSPHATE 6 MG: 4 INJECTION, SOLUTION INTRAMUSCULAR; INTRAVENOUS at 11:58

## 2021-09-10 RX ADMIN — SODIUM CHLORIDE 50 ML: 900 INJECTION INTRAVENOUS at 16:13

## 2021-09-10 RX ADMIN — SODIUM CHLORIDE 94 ML: 9 INJECTION, SOLUTION INTRAVENOUS at 12:16

## 2021-09-10 RX ADMIN — IPRATROPIUM BROMIDE AND ALBUTEROL SULFATE 3 ML: 2.5; .5 SOLUTION RESPIRATORY (INHALATION) at 19:32

## 2021-09-10 RX ADMIN — ENOXAPARIN SODIUM 40 MG: 40 INJECTION SUBCUTANEOUS at 22:34

## 2021-09-10 RX ADMIN — IOPAMIDOL 70 ML: 755 INJECTION, SOLUTION INTRAVENOUS at 12:16

## 2021-09-10 RX ADMIN — IPRATROPIUM BROMIDE AND ALBUTEROL SULFATE 3 ML: .5; 3 SOLUTION RESPIRATORY (INHALATION) at 19:32

## 2021-09-10 RX ADMIN — INSULIN ASPART 5 UNITS: 100 INJECTION, SOLUTION INTRAVENOUS; SUBCUTANEOUS at 22:34

## 2021-09-10 RX ADMIN — REMDESIVIR 200 MG: 100 INJECTION, POWDER, LYOPHILIZED, FOR SOLUTION INTRAVENOUS at 14:45

## 2021-09-10 ASSESSMENT — ENCOUNTER SYMPTOMS
FEVER: 0
SHORTNESS OF BREATH: 0
APPETITE CHANGE: 1
COUGH: 1
VOMITING: 1
ABDOMINAL PAIN: 0
DIARRHEA: 1

## 2021-09-10 NOTE — H&P
[facility]    History and Physical  Hospitalist       Date of Admission:  9/10/2021    Assessment & Plan   Irma Anglin is a 65 year old female with PMH diabetes and hyperlipidemia who presents with cough/suspected Covid    Cough, dyspnea  Covid 19 pneumonia  1 week history of cough, nonproductive, afebrile. Emesis associated after coughing episodes without associated abdominal symptoms. Per ED provider, patient through  states she received Covid vaccination however daughter states patient is unvaccinated. In ED admission O2 sat 85% on room air, requiring 3 L NC. Chest CT, patchy bilateral GGO; no consolidation. Sodium 126, BUN/CR 9/0.3, glucose 353, ALT/AST 76/82. , LA 1.4. Troponins negative. BNP 32. D-dimer 1.0, chest CT negative for PE. In ED, received dexamethasone IV and remdesivir. At this time no indication of secondary bacterial pneumonia, WBC WNL, afebrile, procalcitonin pending.  -Decadron 6 mg daily x 10days.   -IV remdesivir x 5 days. ALT AST 1 5-2 x elevated  -Lovenox 40 mg subcutaneous daily per covid protocol for DVT prophylaxis.   -Oximetry; Supplemental oxygen; wean as able  -albuterol inhalers scheduled and PRN  -Covid markers per protocol  -ambulate  -Supportive cares.     Diabetes type 2, insulin taking  PTA not verified at this time; discrepancy between Endocrinology Note 7/26/2021 with patient taking Actos 45mg/day and aspartame 52/60 units unverified PTA medications of Lantus 52 units every morning, 62 units every afternoon, NovoLog 3 times daily at 52 units, 60 units and 35 units with each meal and Actos 45 mg daily  Admission glucose 353;   Patient states she took her Actos and Lantus this morning, will hold Actos and initiate Lantus however need to confirm dose [needs verification with Daughter] and initiate SSI.  -Last A1c 5/2021 at 10.2 which indicates poor control however by endocrinology note this was before start on Actos and  last visit.  Recheck A1c.  Monitor glucoses, adjust insulin accordingly especially while on dexamethasone.  -Moderate CHO diet    Hyponatremia  Likely related to hyperglycemia; no indications of dehydration; BUN/Cr on admission 9/0.3;   Recheck in AM after addressing hyperglycemia.    Language Barrier, speaks Croatian      DVT Prophylaxis: Enoxaparin (Lovenox) SQ  Code Status: Full Code  Expected discharge: 2-3 days pending Covid course and hypoxia    William Smith MD    Primary Care Physician   [facility]    Chief Complaint   Cough    History is obtained from the patient through  and ED Provider    History of Present Illness   Irma Anglin is a 65 year old female who presents with cough x 1 wk. reports no dyspnea or wheeze. Cough nonproductive. Cough to the point of vomiting. No associated abdominal symptoms. No fever. Per ED provider through  patient states she had received Covid vaccination however daughter states patient is not vaccinated. Remainder of HPI as above.    Past Medical History    I have reviewed this patient's medical history and updated it with pertinent information if needed.   Past Medical History:   Diagnosis Date     Diabetes (H)      Diabetes mellitus (H)      Gastro-oesophageal reflux disease        Past Surgical History   I have reviewed this patient's surgical history and updated it with pertinent information if needed.  Past Surgical History:   Procedure Laterality Date     ABDOMEN SURGERY         Prior to Admission Medications   Prior to Admission Medications   Prescriptions Last Dose Informant Patient Reported? Taking?   HYDROcodone-acetaminophen (NORCO) 5-325 MG per tablet   No No   Sig: Take 1 tablet by mouth every 6 hours as needed for pain   INSULIN ASPART SC   Yes No   Sig: Inject  Subcutaneous.     Insulin Aspart (NOVOLOG SC)   Yes No      Facility-Administered Medications: None     Allergies   Allergies   Allergen  Reactions     Codeine      Other reaction(s): Dizziness  Not clear if ever had a rash     Exenatide Nausea and Vomiting     Nausea NO VOMITING     Metformin Nausea and Vomiting     Hydrocodone-Acetaminophen Rash       Social History   I have reviewed this patient's social history and updated it with pertinent information if needed. Irma Anglin  reports that she has quit smoking. She does not have any smokeless tobacco history on file. She reports that she does not drink alcohol and does not use drugs.    Family History   I have reviewed this patient's family history and updated it with pertinent information if needed.   No family history on file.    Review of Systems   The 10 point Review of Systems is negative other than noted in the HPI    Physical Exam   Temp: 99.6  F (37.6  C) Temp src: Oral BP: 126/56 Pulse: 92   Resp: 20 SpO2: 94 % O2 Device: Nasal cannula Oxygen Delivery: 3 LPM  Vital Signs with Ranges  Temp:  [99.6  F (37.6  C)] 99.6  F (37.6  C)  Pulse:  [] 92  Resp:  [20] 20  BP: (126-148)/(56-63) 126/56  SpO2:  [87 %-94 %] 94 %  0 lbs 0 oz    Constitutional:              NAD, alert, calm, cooperative .  Language Barrier  HENT:    Atraumatic  Nose:                           Nose normal.   Mouth/Throat:              Face covering in place    Eyes:                           Conjunctivae normal and EOM are normal.                                       Pupils are equal, round, and reactive to light.   Neck:                           Trachea normal.   Cardiovascular:           Regular rhythm, normal heart sounds No murmur heard.  Pulmonary/Chest:       O2 per NC.  Coarse breath sounds throughout.  Abdominal:                  Soft. Normal appearance and bowel sounds are normal.                                       There is no tenderness.                                       There is no rebound and no CVA tenderness.   Musculoskeletal:         Extremities atraumatic x 4.   Lymphadenopathy:      No cervical adenopathy.   Neurological:               Alert and oriented to person, place, and time. Normal strength.                                       No cranial nerve deficit or sensory deficit appreciated   Skin:                            Skin is intact. No rash noted.   Psychiatric:                  affect calm    Data     Recent Labs   Lab 09/10/21  1156 09/10/21  1155 09/10/21  1146   WBC 5.8  --   --    HGB 13.1  --   --    MCV 91  --   --    *  --   --    NA  --  126*  --    POTASSIUM  --  4.2  --    CHLORIDE  --  98  --    CO2  --  20  --    BUN  --  9  --    CR  --  0.43* 0.3*   ANIONGAP  --  8  --    GINNA  --  8.0*  --    GLC  --  353*  --    ALBUMIN  --  2.9*  --    PROTTOTAL  --  8.0  --    BILITOTAL  --  0.7  --    ALKPHOS  --  75  --    ALT  --  76*  --    AST  --  82*  --    TROPONIN  --  <0.015  --        Recent Results (from the past 24 hour(s))   CT Chest Pulmonary Embolism w Contrast    Narrative    CT CHEST PULMONARY EMBOLISM WITH CONTRAST 9/10/2021 12:57 PM    CLINICAL HISTORY: Hypoxia, COVID, pulmonary embolus.    TECHNIQUE: CT angiogram chest during arterial phase injection IV  contrast. 2D and 3D MIP reconstructions were performed by the CT  technologist. Dose reduction techniques were used.     CONTRAST: 70 mL Isovue-370    COMPARISON: None.    FINDINGS:  ANGIOGRAM CHEST: Pulmonary arteries are normal caliber and negative  for pulmonary emboli. Thoracic aorta is negative for dissection. No CT  evidence of right heart strain.    LUNGS AND PLEURA: Patchy bilateral groundglass opacities are present.  No airspace consolidation, pneumothorax, or pleural effusion.    MEDIASTINUM/AXILLAE: No pathologically enlarged thoracic or axillary  lymph nodes. Normal caliber thoracic aorta. Small hiatal hernia.    CORONARY ARTERY CALCIFICATION: None.    UPPER ABDOMEN: Diffuse low-attenuation of the liver compatible fatty  infiltration.    MUSCULOSKELETAL: No destructive bone lesions.       Impression    IMPRESSION:  1.  No evidence of pulmonary embolism.    2. Commonly reported imaging features of (COVID-19) pneumonia are  present. Influenza pneumonia, organizing pneumonia, drug toxicity and  connective tissue disease can cause a similar imaging pattern.    PAM SILVA MD         SYSTEM ID:  [id number]

## 2021-09-10 NOTE — ED PROVIDER NOTES
History     Chief Complaint:  Suspected Covid    The history is provided by the patient. A  was used.      Irma Anglin is a 65 year old female with a history of diabetes and hyperlipidemia who presents with suspected Covid. The patient states that she has had a week of a cough. The patient states that she is often coughing until she vomits. She denies any chest pain or much shortness of breath. The patient denies any fever. She reports some diarrhea but denies any abdominal pain. The patient reports loss of taste and smell. The patient states that she is vaccinated against Covid-19, however her daughter reports that she is not.  She has no further complaints.    Review of Systems   Constitutional: Positive for appetite change. Negative for fever.   Respiratory: Positive for cough. Negative for shortness of breath.    Cardiovascular: Negative for chest pain.   Gastrointestinal: Positive for diarrhea and vomiting. Negative for abdominal pain.   All other systems reviewed and are negative.      Allergies:  Codeine  Exenatide  Metformin  Hydrocodone-Acetaminophen    Medications:    Insulin   Gabapentin   Baby aspirin   Prilosec   Flexeril   Crestor     Past Medical History:    Diabetes   GERD    Hyperlipidemia     Past Surgical History:    Abdomen surgery   Hysterectomy     Family History:    Colon cancer- brother   Liver cirrhosis- mother     Social History:  Maltese speaker     Physical Exam     Patient Vitals for the past 24 hrs:   BP Temp Temp src Pulse Resp SpO2   09/10/21 1330 -- -- -- 92 -- 94 %   09/10/21 1300 126/56 -- -- 96 -- 94 %   09/10/21 1200 -- -- -- -- -- 94 %   09/10/21 1130 -- -- -- -- -- 92 %   09/10/21 1118 -- -- -- -- -- 91 %   09/10/21 1115 -- -- -- -- -- (!) 87 %   09/10/21 1102 (!) 148/63 99.6  F (37.6  C) Oral 102 20 (!) 88 %     Physical Exam  Nursing note and vitals reviewed.  Constitutional:  Oriented to person, place, and time. Cooperative.   HENT:    Nose:    Nose normal.   Mouth/Throat:   Face covering in place    Eyes:    Conjunctivae normal and EOM are normal.      Pupils are equal, round, and reactive to light.   Neck:    Trachea normal.   Cardiovascular:  Tachycardia rate, regular rhythm, normal heart sounds and normal pulses. No murmur heard.  Pulmonary/Chest:  Tachypneic.  Coarse breath sounds throughout.  Abdominal:   Soft. Normal appearance and bowel sounds are normal.      There is no tenderness.      There is no rebound and no CVA tenderness.   Musculoskeletal:  Extremities atraumatic x 4.   Lymphadenopathy:  No cervical adenopathy.   Neurological:   Alert and oriented to person, place, and time. Normal strength.      No cranial nerve deficit or sensory deficit. GCS eye subscore is 4. GCS verbal subscore is 5. GCS motor subscore is 6.   Skin:    Skin is intact. No rash noted.   Psychiatric:   Normal mood and affect.    Emergency Department Course   ECG:  ECG taken at 1153, ECG read at 1214  Normal sinus rhythm    Rate 99 bpm. OH interval 122 ms. QRS duration 76 ms. QT/QTc 348/446 ms. P-R-T axes 28 1 31.     Imaging:    Chest CT pulmonary embolism with contrast:   1.  No evidence of pulmonary embolism.     2. Commonly reported imaging features of (COVID-19) pneumonia are   present. Influenza pneumonia, organizing pneumonia, drug toxicity and   connective tissue disease can cause a similar imaging pattern.  Reading per radiology    Laboratory:  Creatinine POCT: 0.3 (L)     D dimer: 1.00 (H)     Lactic acid (result time 1208) 1.4     Blood culture: pending X2    Symptomatic COVID19 Virus PCR by nasopharyngeal swab: positive (A)      CBC: WBC 5.8, HGB 13.1, (L)     CMP: Na 126 (L) Ca 8.0 (L) glucose 353 (H) AST 82 (H) ALT 76 (H) albumin 2.9 (L)  o/w WNL (Creatinine 0.43(L))      Troponin (Collected 1155): <0.015    CRP: 115.0(H)      Erythrocyte sedimentation rate: pending      BNP: 32     Procalcitonin: pending      Procedures:      Emergency  Department Course:    Reviewed:  I reviewed nursing notes, vitals, past history and care everywhere    Assessments:  1124 I obtained history and examined the patient as noted above.     1310 I rechecked the patient and explained findings.     Consults:   1333 I spoke with Dr. Smith of the Hospitalist service from [facility] regarding patient's presentation, findings, and plan of care.    Interventions:  1158 Decadron 6 mg IV    1445 Remdesivir 200 mg IV     Disposition:  The patient was admitted to the hospital under the care of Dr. Smith.    Impression & Plan      Medical Decision Making:  This is a 65-year-old female who came in for further evaluation of URI-like symptoms for the last week or so.  Upon arrival here, she was hypoxic on room air at 85%.  She was also somewhat tachypneic and had coarse breath sounds throughout.  I was quite suspicious she would have Covid pneumonia, but I also considered other causes such as bacterial pneumonia, CHF, and pulmonary embolism.  I proceeded with the above work-up including the blood work and CT scan.  She does appear to have Covid pneumonia based on her CT scan results, but she does not have a pulmonary embolism.  I am suspicious that she actually is not vaccinated against Covid, despite her claim that she has.  Her daughter also indicated to the triage nurse that she has not been vaccinated.  She was provided IV dexamethasone, and I ordered IV remdesivir.  I spoke with Dr. Smith, who will be admitting her.    Covid-19  Irma Anglin was evaluated during a global COVID-19 pandemic, which necessitated consideration that the patient might be at risk for infection with the SARS-CoV-2 virus that causes COVID-19.   Applicable protocols for evaluation were followed during the patient's care.   COVID-19 was considered as part of the patient's evaluation. The plan for testing is:  a test was obtained during this visit.    Diagnosis:    ICD-10-CM    1.  Pneumonia due to 2019 novel coronavirus  U07.1     J12.82    2. Hypoxia  R09.02        Scribe Disclosure:  I, Isabel Briseno, am serving as a scribe at 11:09 AM on 9/10/2021 to document services personally performed by Karlos Vidales MD based on my observations and the provider's statements to me.      Karlos Vidales MD  09/10/21 [id number]

## 2021-09-10 NOTE — ED PROVIDER NOTES
Signout received at change of shift.  65-year-old female currently boarding in the ED, admitted for COVID-19 pneumonia with hypoxic respiratory failure.     Saida Mack MD  09/10/21 7095

## 2021-09-10 NOTE — ED NOTES
[facility]  ED Nurse Handoff Report    ED Chief complaint: Suspected Covid      ED Diagnosis:   Final diagnoses:   Pneumonia due to 2019 novel coronavirus   Hypoxia       Code Status: not discussed by ED RN     Allergies:   Allergies   Allergen Reactions     Codeine      Other reaction(s): Dizziness  Not clear if ever had a rash     Exenatide Nausea and Vomiting     Nausea NO VOMITING     Metformin Nausea and Vomiting     Hydrocodone-Acetaminophen Rash       Patient Story: 65F coming from home for SOB  Focused Assessment:  Pt has covid. States got vaccinated 6 months ago. Started feeling unwell for a week. sats in the mid 80s on RA. Pt given remdesivir and dexmethasone in the ED. Pt alert and able to make her needs known    Treatments and/or interventions provided: O2 at 3L NC. Remdesivir, decadron  Patient's response to treatments and/or interventions: feeling slightly better with O2 on    To be done/followed up on inpatient unit:      Does this patient have any cognitive concerns?: unknown, seems alert    Activity level - Baseline/Home:  Unknown  Activity Level - Current:   Unknown    Patient's Preferred language: Azeri   Needed?: Yes    Isolation: None and COVID r/o and special precautions  Infection:   COVID r/o and special precautions  Patient tested for COVID 19 prior to admission: YES  Bariatric?: No    Vital Signs:   Vitals:    09/10/21 1130 09/10/21 1200 09/10/21 1300 09/10/21 1330   BP:   126/56    Pulse:   96 92   Resp:       Temp:       TempSrc:       SpO2: 92% 94% 94% 94%       Cardiac Rhythm:     Was the PSS-3 completed:   Yes  What interventions are required if any?               Family Comments:   OBS brochure/video discussed/provided to patient/family: N/A              Name of person given brochure if not patient:               Relationship to patient:     For the majority of the shift this patient's behavior was Green.   Behavioral interventions performed were .    ED  NURSE PHONE NUMBER: [phone]

## 2021-09-11 LAB
ALT SERPL W P-5'-P-CCNC: 60 U/L (ref 0–50)
ANION GAP SERPL CALCULATED.3IONS-SCNC: 13 MMOL/L (ref 3–14)
AST SERPL W P-5'-P-CCNC: 57 U/L (ref 0–45)
BUN SERPL-MCNC: 16 MG/DL (ref 7–30)
CALCIUM SERPL-MCNC: 8.2 MG/DL (ref 8.5–10.1)
CHLORIDE BLD-SCNC: 105 MMOL/L (ref 94–109)
CO2 SERPL-SCNC: 16 MMOL/L (ref 20–32)
CREAT SERPL-MCNC: 0.48 MG/DL (ref 0.52–1.04)
CRP SERPL-MCNC: 116 MG/L (ref 0–8)
D DIMER PPP FEU-MCNC: 0.9 UG/ML FEU (ref 0–0.5)
ERYTHROCYTE [DISTWIDTH] IN BLOOD BY AUTOMATED COUNT: 11.9 % (ref 10–15)
FIBRINOGEN PPP-MCNC: 586 MG/DL (ref 170–490)
GFR SERPL CREATININE-BSD FRML MDRD: >90 ML/MIN/1.73M2
GLUCOSE BLD-MCNC: 360 MG/DL (ref 70–99)
GLUCOSE BLDC GLUCOMTR-MCNC: 358 MG/DL (ref 70–99)
GLUCOSE BLDC GLUCOMTR-MCNC: 390 MG/DL (ref 70–99)
GLUCOSE BLDC GLUCOMTR-MCNC: 393 MG/DL (ref 70–99)
GLUCOSE BLDC GLUCOMTR-MCNC: 413 MG/DL (ref 70–99)
GLUCOSE BLDC GLUCOMTR-MCNC: 424 MG/DL (ref 70–99)
HCT VFR BLD AUTO: 37.1 % (ref 35–47)
HGB BLD-MCNC: 12.5 G/DL (ref 11.7–15.7)
MCH RBC QN AUTO: 31.3 PG (ref 26.5–33)
MCHC RBC AUTO-ENTMCNC: 33.7 G/DL (ref 31.5–36.5)
MCV RBC AUTO: 93 FL (ref 78–100)
PLATELET # BLD AUTO: 157 10E3/UL (ref 150–450)
POTASSIUM BLD-SCNC: 4.2 MMOL/L (ref 3.4–5.3)
RBC # BLD AUTO: 3.99 10E6/UL (ref 3.8–5.2)
SODIUM SERPL-SCNC: 134 MMOL/L (ref 133–144)
WBC # BLD AUTO: 5.2 10E3/UL (ref 4–11)

## 2021-09-11 PROCEDURE — 84460 ALANINE AMINO (ALT) (SGPT): CPT | Performed by: EMERGENCY MEDICINE

## 2021-09-11 PROCEDURE — 250N000011 HC RX IP 250 OP 636: Performed by: HOSPITALIST

## 2021-09-11 PROCEDURE — 86140 C-REACTIVE PROTEIN: CPT | Performed by: HOSPITALIST

## 2021-09-11 PROCEDURE — 99233 SBSQ HOSP IP/OBS HIGH 50: CPT | Performed by: INTERNAL MEDICINE

## 2021-09-11 PROCEDURE — 999N000157 HC STATISTIC RCP TIME EA 10 MIN

## 2021-09-11 PROCEDURE — 85384 FIBRINOGEN ACTIVITY: CPT | Performed by: HOSPITALIST

## 2021-09-11 PROCEDURE — 250N000013 HC RX MED GY IP 250 OP 250 PS 637: Performed by: INTERNAL MEDICINE

## 2021-09-11 PROCEDURE — 36415 COLL VENOUS BLD VENIPUNCTURE: CPT | Performed by: HOSPITALIST

## 2021-09-11 PROCEDURE — 85379 FIBRIN DEGRADATION QUANT: CPT | Performed by: HOSPITALIST

## 2021-09-11 PROCEDURE — 250N000012 HC RX MED GY IP 250 OP 636 PS 637: Performed by: INTERNAL MEDICINE

## 2021-09-11 PROCEDURE — 84450 TRANSFERASE (AST) (SGOT): CPT | Performed by: EMERGENCY MEDICINE

## 2021-09-11 PROCEDURE — 250N000009 HC RX 250: Performed by: EMERGENCY MEDICINE

## 2021-09-11 PROCEDURE — 120N000001 HC R&B MED SURG/OB

## 2021-09-11 PROCEDURE — 85027 COMPLETE CBC AUTOMATED: CPT | Performed by: HOSPITALIST

## 2021-09-11 PROCEDURE — 258N000003 HC RX IP 258 OP 636: Performed by: EMERGENCY MEDICINE

## 2021-09-11 PROCEDURE — 80048 BASIC METABOLIC PNL TOTAL CA: CPT | Performed by: HOSPITALIST

## 2021-09-11 RX ORDER — CARBOXYMETHYLCELLULOSE SODIUM 5 MG/ML
1 SOLUTION/ DROPS OPHTHALMIC 3 TIMES DAILY PRN
Status: DISCONTINUED | OUTPATIENT
Start: 2021-09-11 | End: 2021-09-16

## 2021-09-11 RX ORDER — INSULIN LISPRO 100 [IU]/ML
1-20 INJECTION, SOLUTION INTRAVENOUS; SUBCUTANEOUS
Status: DISCONTINUED | OUTPATIENT
Start: 2021-09-11 | End: 2021-09-11 | Stop reason: ALTCHOICE

## 2021-09-11 RX ORDER — BENZONATATE 100 MG/1
100 CAPSULE ORAL 3 TIMES DAILY PRN
Status: DISCONTINUED | OUTPATIENT
Start: 2021-09-11 | End: 2021-09-22 | Stop reason: HOSPADM

## 2021-09-11 RX ADMIN — INSULIN GLARGINE 30 UNITS: 100 INJECTION, SOLUTION SUBCUTANEOUS at 17:18

## 2021-09-11 RX ADMIN — SODIUM CHLORIDE 50 ML: 9 INJECTION, SOLUTION INTRAVENOUS at 17:25

## 2021-09-11 RX ADMIN — BENZONATATE 100 MG: 100 CAPSULE ORAL at 21:15

## 2021-09-11 RX ADMIN — REMDESIVIR 100 MG: 100 INJECTION, POWDER, LYOPHILIZED, FOR SOLUTION INTRAVENOUS at 17:24

## 2021-09-11 RX ADMIN — INSULIN ASPART 4 UNITS: 100 INJECTION, SOLUTION INTRAVENOUS; SUBCUTANEOUS at 22:06

## 2021-09-11 RX ADMIN — Medication 1 DROP: at 13:24

## 2021-09-11 RX ADMIN — ENOXAPARIN SODIUM 40 MG: 40 INJECTION SUBCUTANEOUS at 21:06

## 2021-09-11 RX ADMIN — IPRATROPIUM BROMIDE AND ALBUTEROL 2 PUFF: 20; 100 SPRAY, METERED RESPIRATORY (INHALATION) at 08:42

## 2021-09-11 RX ADMIN — BENZONATATE 100 MG: 100 CAPSULE ORAL at 13:24

## 2021-09-11 RX ADMIN — IPRATROPIUM BROMIDE AND ALBUTEROL 2 PUFF: 20; 100 SPRAY, METERED RESPIRATORY (INHALATION) at 13:30

## 2021-09-11 RX ADMIN — IPRATROPIUM BROMIDE AND ALBUTEROL 2 PUFF: 20; 100 SPRAY, METERED RESPIRATORY (INHALATION) at 21:06

## 2021-09-11 ASSESSMENT — ACTIVITIES OF DAILY LIVING (ADL)
ADLS_ACUITY_SCORE: 19
ADLS_ACUITY_SCORE: 19
ADLS_ACUITY_SCORE: 20
ADLS_ACUITY_SCORE: 19
ADLS_ACUITY_SCORE: 20
ADLS_ACUITY_SCORE: 19

## 2021-09-11 NOTE — PHARMACY-ADMISSION MEDICATION HISTORY
Pharmacy Medication History  Admission medication history interview status for the 9/10/2021  admission is complete. See EPIC admission navigator for prior to admission medications     Location of Interview: Patient room  Medication history sources: Patient, Pharmacy ([facility] and [facility]) and Care Everywhere    In the past week, patient estimated taking medication this percent of the time: 50-90% due to other    Additional medication history information:   1. Pharmacy attempted interview with  and was unsuccessful with patient and family- both did not know medications, but states they fill at [facility]. No outside records and [facility] does not have a file for her. Recent 7/26/2021 telemedicine note from [facility] that discusses diabetes medications, PTA list updated per that but unknown if patient is taking as prescribed.  2. Pharmacy will update PTA list if they receive any new information.    Medication reconciliation completed by provider prior to medication history? No    Time spent in this activity: 35 minutes    Prior to Admission medications    Medication Sig Last Dose Taking? Auth Provider   insulin aspart (NOVOLOG VIAL) 100 UNITS/ML vial Inject Subcutaneous 3 times daily (with meals) Inject 52 units with breakfast, 60 units with lunch, and 30 units with dinner   Reported, Patient   insulin glargine (LANTUS PEN) 100 UNIT/ML pen Inject 62 Units Subcutaneous 2 times daily    Unknown, Entered By History   pioglitazone (ACTOS) 45 MG tablet Take 45 mg by mouth daily   Unknown, Entered By History   rosuvastatin (CRESTOR) 20 MG tablet Take 20 mg by mouth daily   Unknown, Entered By History     The information provided in this note is only as accurate as the sources available at the time of update(s)     Zaire BrandonD, PGY-1 Resident

## 2021-09-11 NOTE — PLAN OF CARE
Pt arrived to floor at approx 2040. VSS. On high flow O2, denies SOB at rest. Ambulated from stretcher to bed.

## 2021-09-11 NOTE — PLAN OF CARE
Orientation: A & O x 4. Maori speaking.   VS/ O2/ IV: VSS on High Flow O2. IV SL. COVID prec.   Mobility: SBA to bedside commode.   Pain Management: Back pain, prn tylenol available. Repositioned.  Diet: Mod carb, adequate intake.   Bowel/ Bladder: Continent. Passing gas.   Labs/ BG: ,390. MD updated re: high bs.   Skin: C/D/I  CMS: Intact.  Discharge/ Plan: Continue to monitor.

## 2021-09-11 NOTE — PLAN OF CARE
Pt is A&Ox4. Northern Irish speaking,  via ipad at bedside- understands some English. VSS. O2 stable on high flow. Up w/ 1 assist. Generalized weakness- use of BSC. IV SL. , Novolog given per orders, recheck at 413. Pt alert/ asymptomatic. Provider notified. Spoke w/ daughter, provided update. Continue special precautions- COVID/  monitor.

## 2021-09-11 NOTE — PLAN OF CARE
Patient alert and oriented, Indonesian speaking with some English, high blood sugar, 424 at 0100, 5 unit of insulin given per provider, 393 at recheck, notified on call provider, SBA with walker, frequent non productive cough, high flow oxygen, on precaution for covid, voiding adequately.

## 2021-09-11 NOTE — PROGRESS NOTES
[facility]    Hospitalist Progress Note    Assessment & Plan   Irma Anglin is a 65 year old female with PMH diabetes and hyperlipidemia who presents with cough/suspected Covid     Cough, dyspnea  Covid 19 pneumonia  Acute hypoxic respiratory failure  1 week history of cough, nonproductive, afebrile. Emesis associated after coughing episodes without associated abdominal symptoms. Per ED provider, patient through  states she received Covid vaccination however daughter states patient is unvaccinated. In ED admission O2 sat 85% on room air, requiring 3 L NC. Chest CT, patchy bilateral GGO; no consolidation. Sodium 126, BUN/CR 9/0.3, glucose 353, ALT/AST 76/82. , LA 1.4. Troponins negative. BNP 32. D-dimer 1.0, chest CT negative for PE. In ED, received dexamethasone IV and remdesivir. At this time no indication of secondary bacterial pneumonia, WBC WNL, afebrile, procalcitonin pending.  -Decadron 6 mg daily x 10days.  Start date 9/10  -IV remdesivir x 5 days. ALT AST 1 5-2 x elevated, started 9/10  -Lovenox 40 mg subcutaneous daily per covid protocol for DVT prophylaxis.   -Oximetry; Supplemental oxygen; wean as able  -albuterol inhalers scheduled and PRN  -Covid markers per protocol  -ambulate  -Supportive cares.      Diabetes type 2, insulin taking  PTA not verified at this time; discrepancy between Endocrinology Note 7/26/2021 with patient taking Actos 45mg/day and aspartame 52/60 units unverified PTA medications of Lantus 52 units every morning, 62 units every afternoon, NovoLog 3 times daily at 52 units, 60 units and 35 units with each meal and Actos 45 mg daily  -Last A1c 5/2021 at 10.2 which indicates poor control, repeat hemoglobin A1c is 9.7.  -Once her oral intake is increased and with steroids her blood sugars are quite elevated, started with a 30 units of Lantus and Premeal coverage.  Will need further adjustment depending on her blood sugars, BMP  ordered for tomorrow a.m.  -Moderate CHO diet     Hyponatremia  Resolved following hydration.     Language Barrier, speaks Burundian    DVT Prophylaxis: lovenox   Code Status: Full Code     Disposition: Expected discharge in 2-3 days depending on O2 needs  Contacted family and updated them  Kaur Landaverde MD  Text Page   (7am to 6pm)    Interval History   Patient continues to have significant shortness of breath, quite debilitated, patient mentioned that she took Covid vaccine through a mobile unit while she was working as an , she also mentioned that she did not share this information with her family since they were against vaccination.  She denies any nausea, chest pain, or 30 L of O2.high flow.  I did review the [place] immunization schedule and it does not seem it has been updated that patient took Covid vaccine.    -Data reviewed today: I reviewed all new labs and imaging results over the last 24 hours.  Physical Exam   Temp: 97.5  F (36.4  C) Temp src: Oral BP: 119/64 Pulse: 86   Resp: 18 SpO2: 94 % O2 Device: High Flow Nasal Cannula (HFNC) Oxygen Delivery: 35 LPM  There were no vitals filed for this visit.  Vital Signs with Ranges  Temp:  [97.5  F (36.4  C)-98.6  F (37  C)] 97.5  F (36.4  C)  Pulse:  [] 86  Resp:  [16-18] 18  BP: (117-140)/(56-76) 119/64  FiO2 (%):  [40 %-45 %] 45 %  SpO2:  [88 %-100 %] 94 %  No intake/output data recorded.    Constitutional: Awake, alert, cooperative, no apparent distress  Respiratory: bilateral basilar crackles+  Cardiovascular: Regular rate and rhythm, normal S1 and S2, and no murmur noted  GI: Normal bowel sounds, soft, non-distended, non-tender  Skin/Integumen: No rashes, no cyanosis, no edema  Neuro : moving all 4 extremities, no focal deficit noted     Medications     - MEDICATION INSTRUCTIONS -         remdesivir  100 mg Intravenous Q24H    And     sodium chloride 0.9%  50 mL Intravenous Q24H     enoxaparin ANTICOAGULANT  40 mg  Subcutaneous Q24H     insulin aspart  1-7 Units Subcutaneous TID AC     insulin aspart  1-5 Units Subcutaneous At Bedtime     ipratropium-albuterol  2 puff Inhalation 4x Daily     sodium chloride (PF)  3 mL Intracatheter Q8H       Data   Recent Labs   Lab 09/11/21  0550 09/11/21  0309 09/11/21  0104 09/10/21  1156 09/10/21  1155 09/10/21  1146   WBC 5.2  --   --  5.8  --   --    HGB 12.5  --   --  13.1  --   --    MCV 93  --   --  91  --   --      --   --  145*  --   --      --   --   --  126*  --    POTASSIUM 4.2  --   --   --  4.2  --    CHLORIDE 105  --   --   --  98  --    CO2 16*  --   --   --  20  --    BUN 16  --   --   --  9  --    CR 0.48*  --   --   --  0.43* 0.3*   ANIONGAP 13  --   --   --  8  --    GINNA 8.2*  --   --   --  8.0*  --    * 393* 424*  --  353*  --    ALBUMIN  --   --   --   --  2.9*  --    PROTTOTAL  --   --   --   --  8.0  --    BILITOTAL  --   --   --   --  0.7  --    ALKPHOS  --   --   --   --  75  --    ALT 60*  --   --   --  76*  --    AST 57*  --   --   --  82*  --    TROPONIN  --   --   --   --  <0.015  --      Recent Labs   Lab 09/11/21  0550 09/11/21  0309 09/11/21  0104 09/10/21  2319 09/10/21  2128   * 393* 424* 413* 434*       Imaging:   Recent Results (from the past 24 hour(s))   CT Chest Pulmonary Embolism w Contrast    Narrative    CT CHEST PULMONARY EMBOLISM WITH CONTRAST 9/10/2021 12:57 PM    CLINICAL HISTORY: Hypoxia, COVID, pulmonary embolus.    TECHNIQUE: CT angiogram chest during arterial phase injection IV  contrast. 2D and 3D MIP reconstructions were performed by the CT  technologist. Dose reduction techniques were used.     CONTRAST: 70 mL Isovue-370    COMPARISON: None.    FINDINGS:  ANGIOGRAM CHEST: Pulmonary arteries are normal caliber and negative  for pulmonary emboli. Thoracic aorta is negative for dissection. No CT  evidence of right heart strain.    LUNGS AND PLEURA: Patchy bilateral groundglass opacities are present.  No airspace  consolidation, pneumothorax, or pleural effusion.    MEDIASTINUM/AXILLAE: No pathologically enlarged thoracic or axillary  lymph nodes. Normal caliber thoracic aorta. Small hiatal hernia.    CORONARY ARTERY CALCIFICATION: None.    UPPER ABDOMEN: Diffuse low-attenuation of the liver compatible fatty  infiltration.    MUSCULOSKELETAL: No destructive bone lesions.      Impression    IMPRESSION:  1.  No evidence of pulmonary embolism.    2. Commonly reported imaging features of (COVID-19) pneumonia are  present. Influenza pneumonia, organizing pneumonia, drug toxicity and  connective tissue disease can cause a similar imaging pattern.    PAM SILVA MD         SYSTEM ID:  [id number]

## 2021-09-11 NOTE — PLAN OF CARE
RECEIVING UNIT ED HANDOFF REVIEW    ED Nurse Handoff Report was reviewed by: Leigh Simon RN on September 10, 2021 at 7:51 PM

## 2021-09-11 NOTE — PROGRESS NOTES
MD Notification    Notified Person: MD    Notified Person Name: deon    Notification Date/Time: 1414     Notification Interaction: page    Purpose of Notification: FYI high blood sugars    Orders Received:    Comments:

## 2021-09-11 NOTE — PLAN OF CARE
MD Notification    Notified Person: MD    Notified Person Name: Bj    Notification Date/Time: 9/10/21, 7133    Notification Interaction: page     Purpose of Notification: Notified of elevated BG after giving 5 units Novolog for - per orders.     Orders Received:  5 Units Novolog   Comments:

## 2021-09-11 NOTE — ED NOTES
Pt's O2 saturation dropped to 87% on 4L Nasal Cannula, switched to an Oxymask at 10L with minimal improvement to 89%, Duo-Neb breathing treatment started at this time. Oxygen saturation now 92%.

## 2021-09-12 LAB
ANION GAP SERPL CALCULATED.3IONS-SCNC: 7 MMOL/L (ref 3–14)
BUN SERPL-MCNC: 13 MG/DL (ref 7–30)
CALCIUM SERPL-MCNC: 8.1 MG/DL (ref 8.5–10.1)
CHLORIDE BLD-SCNC: 104 MMOL/L (ref 94–109)
CO2 SERPL-SCNC: 21 MMOL/L (ref 20–32)
CREAT SERPL-MCNC: 0.47 MG/DL (ref 0.52–1.04)
CRP SERPL-MCNC: 77 MG/L (ref 0–8)
D DIMER PPP FEU-MCNC: 0.69 UG/ML FEU (ref 0–0.5)
FIBRINOGEN PPP-MCNC: 500 MG/DL (ref 170–490)
GFR SERPL CREATININE-BSD FRML MDRD: >90 ML/MIN/1.73M2
GLUCOSE BLD-MCNC: 276 MG/DL (ref 70–99)
GLUCOSE BLDC GLUCOMTR-MCNC: 306 MG/DL (ref 70–99)
GLUCOSE BLDC GLUCOMTR-MCNC: 334 MG/DL (ref 70–99)
GLUCOSE BLDC GLUCOMTR-MCNC: 338 MG/DL (ref 70–99)
GLUCOSE BLDC GLUCOMTR-MCNC: 400 MG/DL (ref 70–99)
POTASSIUM BLD-SCNC: 3.6 MMOL/L (ref 3.4–5.3)
SODIUM SERPL-SCNC: 132 MMOL/L (ref 133–144)

## 2021-09-12 PROCEDURE — 80048 BASIC METABOLIC PNL TOTAL CA: CPT | Performed by: INTERNAL MEDICINE

## 2021-09-12 PROCEDURE — 120N000001 HC R&B MED SURG/OB

## 2021-09-12 PROCEDURE — 85379 FIBRIN DEGRADATION QUANT: CPT | Performed by: HOSPITALIST

## 2021-09-12 PROCEDURE — 250N000009 HC RX 250: Performed by: EMERGENCY MEDICINE

## 2021-09-12 PROCEDURE — 999N000157 HC STATISTIC RCP TIME EA 10 MIN

## 2021-09-12 PROCEDURE — 86140 C-REACTIVE PROTEIN: CPT | Performed by: HOSPITALIST

## 2021-09-12 PROCEDURE — 99233 SBSQ HOSP IP/OBS HIGH 50: CPT | Performed by: INTERNAL MEDICINE

## 2021-09-12 PROCEDURE — 85384 FIBRINOGEN ACTIVITY: CPT | Performed by: HOSPITALIST

## 2021-09-12 PROCEDURE — 250N000013 HC RX MED GY IP 250 OP 250 PS 637: Performed by: INTERNAL MEDICINE

## 2021-09-12 PROCEDURE — 250N000011 HC RX IP 250 OP 636: Performed by: HOSPITALIST

## 2021-09-12 PROCEDURE — 250N000012 HC RX MED GY IP 250 OP 636 PS 637: Performed by: INTERNAL MEDICINE

## 2021-09-12 PROCEDURE — 36415 COLL VENOUS BLD VENIPUNCTURE: CPT | Performed by: HOSPITALIST

## 2021-09-12 PROCEDURE — 258N000003 HC RX IP 258 OP 636: Performed by: EMERGENCY MEDICINE

## 2021-09-12 RX ADMIN — Medication 1 DROP: at 08:29

## 2021-09-12 RX ADMIN — BENZONATATE 100 MG: 100 CAPSULE ORAL at 08:28

## 2021-09-12 RX ADMIN — IPRATROPIUM BROMIDE AND ALBUTEROL 2 PUFF: 20; 100 SPRAY, METERED RESPIRATORY (INHALATION) at 08:49

## 2021-09-12 RX ADMIN — INSULIN GLARGINE 12 UNITS: 100 INJECTION, SOLUTION SUBCUTANEOUS at 15:13

## 2021-09-12 RX ADMIN — IPRATROPIUM BROMIDE AND ALBUTEROL 2 PUFF: 20; 100 SPRAY, METERED RESPIRATORY (INHALATION) at 17:58

## 2021-09-12 RX ADMIN — DEXAMETHASONE 6 MG: 2 TABLET ORAL at 14:20

## 2021-09-12 RX ADMIN — INSULIN GLARGINE 30 UNITS: 100 INJECTION, SOLUTION SUBCUTANEOUS at 08:26

## 2021-09-12 RX ADMIN — INSULIN ASPART 5 UNITS: 100 INJECTION, SOLUTION INTRAVENOUS; SUBCUTANEOUS at 21:40

## 2021-09-12 RX ADMIN — IPRATROPIUM BROMIDE AND ALBUTEROL 2 PUFF: 20; 100 SPRAY, METERED RESPIRATORY (INHALATION) at 12:46

## 2021-09-12 RX ADMIN — SODIUM CHLORIDE 50 ML: 9 INJECTION, SOLUTION INTRAVENOUS at 17:52

## 2021-09-12 RX ADMIN — INSULIN GLARGINE 20 UNITS: 100 INJECTION, SOLUTION SUBCUTANEOUS at 11:14

## 2021-09-12 RX ADMIN — ENOXAPARIN SODIUM 40 MG: 40 INJECTION SUBCUTANEOUS at 21:39

## 2021-09-12 RX ADMIN — REMDESIVIR 100 MG: 100 INJECTION, POWDER, LYOPHILIZED, FOR SOLUTION INTRAVENOUS at 17:52

## 2021-09-12 RX ADMIN — IPRATROPIUM BROMIDE AND ALBUTEROL 2 PUFF: 20; 100 SPRAY, METERED RESPIRATORY (INHALATION) at 21:40

## 2021-09-12 ASSESSMENT — ACTIVITIES OF DAILY LIVING (ADL)
ADLS_ACUITY_SCORE: 19
ADLS_ACUITY_SCORE: 20
ADLS_ACUITY_SCORE: 19
ADLS_ACUITY_SCORE: 20

## 2021-09-12 ASSESSMENT — MIFFLIN-ST. JEOR: SCORE: 1366.5

## 2021-09-12 NOTE — PLAN OF CARE
A&Ox4. VSS on 35L highflow O2. Denies pain. Non productive  cough, Tessalon PRN given. Shortness of breath with activity. SBA to bedside commode. BG in 300's. Patient started on Lantus and carb counting this evening.

## 2021-09-12 NOTE — PLAN OF CARE
Patient Vss, on high oxygen flow, SBA, BSC, non productive cough, shortness of breath with activities, continue to monitor. Blood glucose 306.

## 2021-09-12 NOTE — PROGRESS NOTES
[facility]    Hospitalist Progress Note    Assessment & Plan   Irma Anglin is a 65 year old female with PMH diabetes and hyperlipidemia who presents with cough/suspected Covid     Cough, dyspnea  Covid 19 pneumonia  Acute hypoxic respiratory failure  1 week history of cough, nonproductive, afebrile. Emesis associated after coughing episodes without associated abdominal symptoms. Per ED provider, patient through  states she received Covid vaccination however daughter states patient is unvaccinated. In ED admission O2 sat 85% on room air, requiring 3 L NC. Chest CT, patchy bilateral GGO; no consolidation. Sodium 126, BUN/CR 9/0.3, glucose 353, ALT/AST 76/82. , LA 1.4. Troponins negative. BNP 32. D-dimer 1.0, chest CT negative for PE. In ED, received dexamethasone IV and remdesivir. At this time no indication of secondary bacterial pneumonia, WBC WNL, afebrile.  -patient mentioned that she took Covid vaccine through a mobile unit while she was working as an , she also mentioned that she did not share this information with her family since they were against vaccination.   I did review the [place] immunization schedule and it does not seem it has been updated that patient took Covid vaccine.  Patient's daughter did mention that patient did not take the vaccine due to [place] reasons but herself and her family had taken the vaccination.  -Decadron 6 mg daily x 10days.  Start date 9/10  -IV remdesivir x 5 days. ALT AST 1 5-2 x elevated, started 9/10  -Lovenox 40 mg subcutaneous daily per covid protocol for DVT prophylaxis.   -Oximetry; Supplemental oxygen; wean as able, patient is on high flow 35 L/min  -albuterol inhalers scheduled and PRN  -Covid markers per protocol, showing mild improvement  -ambulate  -Supportive cares.      Diabetes type 2, poorly controlled  PTA not verified at this time; discrepancy between Endocrinology Note  7/26/2021 with patient taking Actos 45mg/day and aspartame 52/60 units unverified PTA medications of Lantus 52 units every morning, 62 units every afternoon, NovoLog 3 times daily at 52 units, 60 units and 35 units with each meal and Actos 45 mg daily  -Last A1c 5/2021 at 10.2 which indicates poor control, repeat hemoglobin A1c is 9.7.  -Sugars are quite high, started on 62 units daily, it seems at home she was 62 units twice daily of Lantus, slowly adjust to that level since her oral intake is slowly improving.  Patient is on Premeal coverage 1 unit per 15 g of carb will increase that to 1 unit to 10  grams of carb.  -Moderate CHO diet     Hyponatremia  Resolved following hydration.     Language Barrier, speaks Moroccan    DVT Prophylaxis: lovenox   Code Status: Full Code     Disposition: Expected discharge in 2-3 days depending on O2 needs  Contacted family and updated them  Kaur Landaverde MD  Text Page   (7am to 6pm)    Interval History   Patient continues to have significant shortness of breath, quite debilitated, patient barely speaks English, need to communicate with the , discussed about continuing using incentive spirometry, discussed with nursing, her appetite is improved, cough is improved, still on 35 L of oxygen.    -Data reviewed today: I reviewed all new labs and imaging results over the last 24 hours.  Physical Exam   Temp: 99.1  F (37.3  C) Temp src: Oral BP: 119/68 Pulse: 92   Resp: 18 SpO2: 94 % O2 Device: High Flow Nasal Cannula (HFNC) Oxygen Delivery: 35 LPM  Vitals:    09/12/21 0833   Weight: 89.5 kg (197 lb 5 oz)     Vital Signs with Ranges  Temp:  [98.1  F (36.7  C)-99.1  F (37.3  C)] 99.1  F (37.3  C)  Pulse:  [87-93] 92  Resp:  [18] 18  BP: (108-135)/(56-69) 119/68  FiO2 (%):  [40 %-50 %] 40 %  SpO2:  [92 %-96 %] 94 %  I/O last 3 completed shifts:  In: 720 [P.O.:720]  Out: -     Constitutional: Awake, alert, cooperative, no apparent distress  Respiratory: bilateral basilar  crackles+  Cardiovascular: Regular rate and rhythm, normal S1 and S2, and no murmur noted  GI: Normal bowel sounds, soft, non-distended, non-tender  Skin/Integumen: No rashes, no cyanosis, no edema  Neuro : moving all 4 extremities, no focal deficit noted     Medications     - MEDICATION INSTRUCTIONS -         remdesivir  100 mg Intravenous Q24H    And     sodium chloride 0.9%  50 mL Intravenous Q24H     enoxaparin ANTICOAGULANT  40 mg Subcutaneous Q24H     insulin aspart   Subcutaneous TID AC     insulin aspart  1-7 Units Subcutaneous TID AC     insulin aspart  1-5 Units Subcutaneous At Bedtime     [START ON 9/13/2021] insulin glargine  50 Units Subcutaneous QAM AC     ipratropium-albuterol  2 puff Inhalation 4x Daily     sodium chloride (PF)  3 mL Intracatheter Q8H       Data   Recent Labs   Lab 09/12/21  1211 09/12/21  0552 09/12/21  0201 09/11/21  0550 09/10/21  1156 09/10/21  1155   WBC  --   --   --  5.2 5.8  --    HGB  --   --   --  12.5 13.1  --    MCV  --   --   --  93 91  --    PLT  --   --   --  157 145*  --    NA  --  132*  --  134  --  126*   POTASSIUM  --  3.6  --  4.2  --  4.2   CHLORIDE  --  104  --  105  --  98   CO2  --  21  --  16*  --  20   BUN  --  13  --  16  --  9   CR  --  0.47*  --  0.48*  --  0.43*   ANIONGAP  --  7  --  13  --  8   GINNA  --  8.1*  --  8.2*  --  8.0*   * 276* 306* 360*  --  353*   ALBUMIN  --   --   --   --   --  2.9*   PROTTOTAL  --   --   --   --   --  8.0   BILITOTAL  --   --   --   --   --  0.7   ALKPHOS  --   --   --   --   --  75   ALT  --   --   --  60*  --  76*   AST  --   --   --  57*  --  82*   TROPONIN  --   --   --   --   --  <0.015     Recent Labs   Lab 09/12/21  1211 09/12/21  0552 09/12/21  0201 09/11/21  2152 09/11/21  1705   * 276* 306* 358* 413*       Imaging:   No results found for this or any previous visit (from the past 24 hour(s)).

## 2021-09-12 NOTE — PLAN OF CARE
Orientation: A & O x 4. Slovak speaking.   VS/ O2/ IV: VSS on High Flow O2. IV SL. COVID prec. I/S encouraged.   Mobility: SBA to bedside commode.   Lungs: diminished   Pain Management: Denies pain.   Diet: Low carb, adequate intake.   Bowel/ Bladder: Continent. Passing gas. BM on shift  Labs/ BG: ,338 MD adjusting insulin.   Skin: C/D/I  CMS: Intact.  Discharge/ Plan: Continue to monitor.

## 2021-09-13 LAB
ALT SERPL W P-5'-P-CCNC: 39 U/L (ref 0–50)
AST SERPL W P-5'-P-CCNC: 27 U/L (ref 0–45)
CREAT SERPL-MCNC: 0.45 MG/DL (ref 0.52–1.04)
CRP SERPL-MCNC: 99.7 MG/L (ref 0–8)
D DIMER PPP FEU-MCNC: 0.44 UG/ML FEU (ref 0–0.5)
FIBRINOGEN PPP-MCNC: 516 MG/DL (ref 170–490)
GFR SERPL CREATININE-BSD FRML MDRD: >90 ML/MIN/1.73M2
GLUCOSE BLDC GLUCOMTR-MCNC: 280 MG/DL (ref 70–99)
GLUCOSE BLDC GLUCOMTR-MCNC: 301 MG/DL (ref 70–99)
GLUCOSE BLDC GLUCOMTR-MCNC: 303 MG/DL (ref 70–99)
GLUCOSE BLDC GLUCOMTR-MCNC: 331 MG/DL (ref 70–99)
GLUCOSE BLDC GLUCOMTR-MCNC: 365 MG/DL (ref 70–99)
HBA1C MFR BLD: 9.8 % (ref 0–5.6)
PLATELET # BLD AUTO: 172 10E3/UL (ref 150–450)

## 2021-09-13 PROCEDURE — 258N000003 HC RX IP 258 OP 636: Performed by: EMERGENCY MEDICINE

## 2021-09-13 PROCEDURE — 250N000009 HC RX 250: Performed by: EMERGENCY MEDICINE

## 2021-09-13 PROCEDURE — 86140 C-REACTIVE PROTEIN: CPT | Performed by: HOSPITALIST

## 2021-09-13 PROCEDURE — 82565 ASSAY OF CREATININE: CPT | Performed by: HOSPITALIST

## 2021-09-13 PROCEDURE — 85379 FIBRIN DEGRADATION QUANT: CPT | Performed by: HOSPITALIST

## 2021-09-13 PROCEDURE — 85049 AUTOMATED PLATELET COUNT: CPT | Performed by: HOSPITALIST

## 2021-09-13 PROCEDURE — 250N000013 HC RX MED GY IP 250 OP 250 PS 637: Performed by: INTERNAL MEDICINE

## 2021-09-13 PROCEDURE — 120N000001 HC R&B MED SURG/OB

## 2021-09-13 PROCEDURE — 99232 SBSQ HOSP IP/OBS MODERATE 35: CPT | Performed by: INTERNAL MEDICINE

## 2021-09-13 PROCEDURE — 84460 ALANINE AMINO (ALT) (SGPT): CPT | Performed by: EMERGENCY MEDICINE

## 2021-09-13 PROCEDURE — 250N000012 HC RX MED GY IP 250 OP 636 PS 637: Performed by: INTERNAL MEDICINE

## 2021-09-13 PROCEDURE — 85384 FIBRINOGEN ACTIVITY: CPT | Performed by: HOSPITALIST

## 2021-09-13 PROCEDURE — 999N000157 HC STATISTIC RCP TIME EA 10 MIN

## 2021-09-13 PROCEDURE — 83036 HEMOGLOBIN GLYCOSYLATED A1C: CPT | Performed by: INTERNAL MEDICINE

## 2021-09-13 PROCEDURE — 250N000011 HC RX IP 250 OP 636: Performed by: HOSPITALIST

## 2021-09-13 PROCEDURE — 84450 TRANSFERASE (AST) (SGOT): CPT | Performed by: EMERGENCY MEDICINE

## 2021-09-13 PROCEDURE — 36415 COLL VENOUS BLD VENIPUNCTURE: CPT | Performed by: EMERGENCY MEDICINE

## 2021-09-13 RX ORDER — PIOGLITAZONEHYDROCHLORIDE 45 MG/1
45 TABLET ORAL DAILY
Status: DISCONTINUED | OUTPATIENT
Start: 2021-09-13 | End: 2021-09-22 | Stop reason: HOSPADM

## 2021-09-13 RX ORDER — DEXTROSE MONOHYDRATE 25 G/50ML
25-50 INJECTION, SOLUTION INTRAVENOUS
Status: DISCONTINUED | OUTPATIENT
Start: 2021-09-13 | End: 2021-09-13

## 2021-09-13 RX ORDER — ROSUVASTATIN CALCIUM 20 MG/1
20 TABLET, COATED ORAL DAILY
Status: DISCONTINUED | OUTPATIENT
Start: 2021-09-13 | End: 2021-09-22 | Stop reason: HOSPADM

## 2021-09-13 RX ORDER — NICOTINE POLACRILEX 4 MG
15-30 LOZENGE BUCCAL
Status: DISCONTINUED | OUTPATIENT
Start: 2021-09-13 | End: 2021-09-13

## 2021-09-13 RX ADMIN — DEXAMETHASONE 6 MG: 2 TABLET ORAL at 09:40

## 2021-09-13 RX ADMIN — BENZONATATE 100 MG: 100 CAPSULE ORAL at 02:49

## 2021-09-13 RX ADMIN — IPRATROPIUM BROMIDE AND ALBUTEROL 2 PUFF: 20; 100 SPRAY, METERED RESPIRATORY (INHALATION) at 09:30

## 2021-09-13 RX ADMIN — REMDESIVIR 100 MG: 100 INJECTION, POWDER, LYOPHILIZED, FOR SOLUTION INTRAVENOUS at 18:15

## 2021-09-13 RX ADMIN — SODIUM CHLORIDE 50 ML: 9 INJECTION, SOLUTION INTRAVENOUS at 18:16

## 2021-09-13 RX ADMIN — ROSUVASTATIN CALCIUM 20 MG: 20 TABLET, FILM COATED ORAL at 18:17

## 2021-09-13 RX ADMIN — ENOXAPARIN SODIUM 40 MG: 40 INJECTION SUBCUTANEOUS at 21:01

## 2021-09-13 RX ADMIN — IPRATROPIUM BROMIDE AND ALBUTEROL 2 PUFF: 20; 100 SPRAY, METERED RESPIRATORY (INHALATION) at 13:39

## 2021-09-13 RX ADMIN — BENZONATATE 100 MG: 100 CAPSULE ORAL at 18:17

## 2021-09-13 RX ADMIN — INSULIN GLARGINE 62 UNITS: 100 INJECTION, SOLUTION SUBCUTANEOUS at 09:40

## 2021-09-13 RX ADMIN — IPRATROPIUM BROMIDE AND ALBUTEROL 2 PUFF: 20; 100 SPRAY, METERED RESPIRATORY (INHALATION) at 18:17

## 2021-09-13 RX ADMIN — INSULIN GLARGINE 12 UNITS: 100 INJECTION, SOLUTION SUBCUTANEOUS at 21:24

## 2021-09-13 RX ADMIN — PIOGLITAZONE 45 MG: 45 TABLET ORAL at 18:17

## 2021-09-13 RX ADMIN — IPRATROPIUM BROMIDE AND ALBUTEROL 2 PUFF: 20; 100 SPRAY, METERED RESPIRATORY (INHALATION) at 21:05

## 2021-09-13 ASSESSMENT — ACTIVITIES OF DAILY LIVING (ADL)
ADLS_ACUITY_SCORE: 16

## 2021-09-13 NOTE — PROGRESS NOTES
[facility]  Hospitalist Progress Note  Cosmo Rivera MD  09/13/2021    Assessment & Plan   Irma Anglin is a 65 year old female with PMH diabetes and hyperlipidemia who presents with cough/suspected Covid     Cough, dyspnea  Covid 19 pneumonia  Acute hypoxic respiratory failure  1 week history of cough, nonproductive, afebrile. Emesis associated after coughing episodes without associated abdominal symptoms. Per ED provider, patient through  states she received Covid vaccination however daughter states patient is unvaccinated. In ED admission O2 sat 85% on room air, requiring 3 L NC. Chest CT, patchy bilateral GGO; no consolidation. Sodium 126, BUN/CR 9/0.3, glucose 353, ALT/AST 76/82. , LA 1.4. Troponins negative. BNP 32. D-dimer 1.0, chest CT negative for PE. In ED, received dexamethasone IV and remdesivir. At this time no indication of secondary bacterial pneumonia, WBC WNL, afebrile.  -patient mentioned that she took Covid vaccine through a mobile unit while she was working as an , she also mentioned that she did not share this information with her family since they were against vaccination.   I did review the [place] immunization schedule and it does not seem it has been updated that patient took Covid vaccine.  Patient's daughter did mention that patient did not take the vaccine due to Islam reasons but herself and her family had taken the vaccination.  -Decadron 6 mg daily x 10days.  Start date 9/10, day 4/10  -IV remdesivir x 5 days. ALT AST 1 5-2 x elevated, started 9/10, day 4/5  -Lovenox 40 mg subcutaneous daily per covid protocol for DVT prophylaxis.   -Oximetry; Supplemental oxygen; wean as able, patient is on 13L  -albuterol inhalers scheduled and PRN  -Covid markers per protocol, showing mild improvement  -ambulate  -Supportive cares.      Diabetes type 2, poorly controlled  PTA not verified at this  time; discrepancy between Endocrinology Note 7/26/2021 with patient taking Actos 45mg/day and aspartame 52/60 units unverified PTA medications of Lantus 52 units every morning, 62 units every afternoon, NovoLog 3 times daily at 52 units, 60 units and 35 units with each meal and Actos 45 mg daily  -Last A1c 5/2021 at 10.2 which indicates poor control, repeat hemoglobin A1c is 9.7.  -Sugars are quite high, started on 62 units daily, it seems at home she was 62 units twice daily of Lantus, slowly adjust to that level since her oral intake is slowly improving.  Patient is on Premeal coverage 1 unit per 15 g of carb will increase that to 1 unit to 10  grams of carb.  -Moderate CHO diet  -change from mod to high intensity ssi  -add lantus 12 units qhs     Hyponatremia  Resolved following hydration.     Language Barrier, speaks Kuwaiti     DVT Prophylaxis: lovenox     Code Status: Full Code     Disposition  - anticipate in 2-4 days once oxygen requirements come down    Interval History   -- chart reviewed  -- home meds reviewed  --  Oxygen down to 13L  -- noted hyperglycemia    -Data reviewed today: I reviewed all new labs and imaging over the last 24 hours. I personally reviewed no images or EKG's today.    Physical Exam    , Blood pressure 117/59, pulse 85, temperature 98.3  F (36.8  C), temperature source Oral, resp. rate 20, height 1.524 m (5'), weight 89.5 kg (197 lb 5 oz), SpO2 93 %.  Vitals:    09/12/21 0833   Weight: 89.5 kg (197 lb 5 oz)     Vital Signs with Ranges  Temp:  [98.2  F (36.8  C)-99  F (37.2  C)] 98.3  F (36.8  C)  Pulse:  [79-93] 85  Resp:  [16-20] 20  BP: (117-124)/(59-64) 117/59  FiO2 (%):  [35 %-38 %] 35 %  SpO2:  [91 %-94 %] 93 %  I/O's Last 24 hours  I/O last 3 completed shifts:  In: 250 [P.O.:250]  Out: -          Medications   All medications were reviewed.    - MEDICATION INSTRUCTIONS -         remdesivir  100 mg Intravenous Q24H    And     sodium chloride 0.9%  50 mL Intravenous Q24H      dexamethasone  6 mg Oral Daily     enoxaparin ANTICOAGULANT  40 mg Subcutaneous Q24H     insulin aspart  1-10 Units Subcutaneous TID AC     insulin aspart  1-7 Units Subcutaneous At Bedtime     insulin aspart   Subcutaneous TID AC     insulin glargine  12 Units Subcutaneous At Bedtime     insulin glargine  62 Units Subcutaneous QAM AC     ipratropium-albuterol  2 puff Inhalation 4x Daily     pioglitazone  45 mg Oral Daily     rosuvastatin  20 mg Oral Daily     sodium chloride (PF)  3 mL Intracatheter Q8H        Data   Recent Labs   Lab 09/13/21  1303 09/13/21  0919 09/13/21  0612 09/13/21  0210 09/12/21  0552 09/11/21  0550 09/10/21  1156 09/10/21  1155   WBC  --   --   --   --   --  5.2 5.8  --    HGB  --   --   --   --   --  12.5 13.1  --    MCV  --   --   --   --   --  93 91  --    PLT  --   --  172  --   --  157 145*  --    NA  --   --   --   --  132* 134  --  126*   POTASSIUM  --   --   --   --  3.6 4.2  --  4.2   CHLORIDE  --   --   --   --  104 105  --  98   CO2  --   --   --   --  21 16*  --  20   BUN  --   --   --   --  13 16  --  9   CR  --   --  0.45*  --  0.47* 0.48*  --  0.43*   ANIONGAP  --   --   --   --  7 13  --  8   GINNA  --   --   --   --  8.1* 8.2*  --  8.0*   * 301*  --  303* 276* 360*  --  353*   ALBUMIN  --   --   --   --   --   --   --  2.9*   PROTTOTAL  --   --   --   --   --   --   --  8.0   BILITOTAL  --   --   --   --   --   --   --  0.7   ALKPHOS  --   --   --   --   --   --   --  75   ALT  --   --  39  --   --  60*  --  76*   AST  --   --  27  --   --  57*  --  82*   TROPONIN  --   --   --   --   --   --   --  <0.015       No results found for this or any previous visit (from the past 24 hour(s)).    Cosmo Rivera MD  Text Page  (7am to 6pm)

## 2021-09-13 NOTE — PLAN OF CARE
Sating 91-94% at 13 oxymizer. Desaturates with activity. Other vitals stable. Up with SBA. Uzbek speaking, understand some english. Zakier at bedside. A&OX4.

## 2021-09-13 NOTE — PLAN OF CARE
Mosotho speaking,does speak english a little.Denies pain.Up Stand by assist to bed side commode.High flow oxygen.Diabetic,blood glucose 334,400 coverage given.IV SL.Had Bowel movement on my shift.Encourage incentive spirometer.Will continue to monitor.

## 2021-09-13 NOTE — PLAN OF CARE
Rested well overnight. Continues on high flow 40 lpm at 35%. Desaturates severely with activity. Frequent cough, weak and non productive, PRN Tessalon given x1. Denies pain. A&Ox4. New Zealander speaking, jabber at bedside. Ambulating SBA to BSC. Mod cho diet. Plan to discharge home once O2 needs improved.

## 2021-09-14 LAB
BASE EXCESS BLDA CALC-SCNC: -1.3 MMOL/L (ref -9–1.8)
CRP SERPL-MCNC: 50 MG/L (ref 0–8)
D DIMER PPP FEU-MCNC: 0.65 UG/ML FEU (ref 0–0.5)
FIBRINOGEN PPP-MCNC: 475 MG/DL (ref 170–490)
GLUCOSE BLDC GLUCOMTR-MCNC: 200 MG/DL (ref 70–99)
GLUCOSE BLDC GLUCOMTR-MCNC: 291 MG/DL (ref 70–99)
GLUCOSE BLDC GLUCOMTR-MCNC: 315 MG/DL (ref 70–99)
GLUCOSE BLDC GLUCOMTR-MCNC: 386 MG/DL (ref 70–99)
GLUCOSE BLDC GLUCOMTR-MCNC: 400 MG/DL (ref 70–99)
GLUCOSE BLDC GLUCOMTR-MCNC: 418 MG/DL (ref 70–99)
HCO3 BLD-SCNC: 22 MMOL/L (ref 21–28)
O2/TOTAL GAS SETTING VFR VENT: 75 %
OXYHGB MFR BLD: 94 % (ref 92–100)
PCO2 BLD: 32 MM HG (ref 35–45)
PH BLD: 7.45 [PH] (ref 7.35–7.45)
PO2 BLD: 70 MM HG (ref 80–105)

## 2021-09-14 PROCEDURE — 99232 SBSQ HOSP IP/OBS MODERATE 35: CPT | Performed by: INTERNAL MEDICINE

## 2021-09-14 PROCEDURE — 250N000011 HC RX IP 250 OP 636: Performed by: HOSPITALIST

## 2021-09-14 PROCEDURE — 258N000003 HC RX IP 258 OP 636: Performed by: EMERGENCY MEDICINE

## 2021-09-14 PROCEDURE — 999N000157 HC STATISTIC RCP TIME EA 10 MIN

## 2021-09-14 PROCEDURE — 250N000012 HC RX MED GY IP 250 OP 636 PS 637: Performed by: NURSE PRACTITIONER

## 2021-09-14 PROCEDURE — 36600 WITHDRAWAL OF ARTERIAL BLOOD: CPT

## 2021-09-14 PROCEDURE — 250N000009 HC RX 250: Performed by: EMERGENCY MEDICINE

## 2021-09-14 PROCEDURE — 86140 C-REACTIVE PROTEIN: CPT | Performed by: HOSPITALIST

## 2021-09-14 PROCEDURE — 85379 FIBRIN DEGRADATION QUANT: CPT | Performed by: HOSPITALIST

## 2021-09-14 PROCEDURE — 250N000012 HC RX MED GY IP 250 OP 636 PS 637: Performed by: INTERNAL MEDICINE

## 2021-09-14 PROCEDURE — 250N000013 HC RX MED GY IP 250 OP 250 PS 637: Performed by: INTERNAL MEDICINE

## 2021-09-14 PROCEDURE — 82805 BLOOD GASES W/O2 SATURATION: CPT | Performed by: INTERNAL MEDICINE

## 2021-09-14 PROCEDURE — 36415 COLL VENOUS BLD VENIPUNCTURE: CPT | Performed by: HOSPITALIST

## 2021-09-14 PROCEDURE — 85384 FIBRINOGEN ACTIVITY: CPT | Performed by: HOSPITALIST

## 2021-09-14 PROCEDURE — 120N000001 HC R&B MED SURG/OB

## 2021-09-14 RX ORDER — CARBOXYMETHYLCELLULOSE SODIUM 5 MG/ML
1 SOLUTION/ DROPS OPHTHALMIC
Status: DISCONTINUED | OUTPATIENT
Start: 2021-09-14 | End: 2021-09-22 | Stop reason: HOSPADM

## 2021-09-14 RX ADMIN — DEXAMETHASONE 6 MG: 2 TABLET ORAL at 09:34

## 2021-09-14 RX ADMIN — Medication 1 DROP: at 13:15

## 2021-09-14 RX ADMIN — INSULIN GLARGINE 18 UNITS: 100 INJECTION, SOLUTION SUBCUTANEOUS at 21:57

## 2021-09-14 RX ADMIN — ROSUVASTATIN CALCIUM 20 MG: 20 TABLET, FILM COATED ORAL at 09:33

## 2021-09-14 RX ADMIN — ENOXAPARIN SODIUM 40 MG: 40 INJECTION SUBCUTANEOUS at 20:59

## 2021-09-14 RX ADMIN — SODIUM CHLORIDE 50 ML: 9 INJECTION, SOLUTION INTRAVENOUS at 17:34

## 2021-09-14 RX ADMIN — PIOGLITAZONE 45 MG: 45 TABLET ORAL at 09:33

## 2021-09-14 RX ADMIN — IPRATROPIUM BROMIDE AND ALBUTEROL 2 PUFF: 20; 100 SPRAY, METERED RESPIRATORY (INHALATION) at 13:11

## 2021-09-14 RX ADMIN — IPRATROPIUM BROMIDE AND ALBUTEROL 2 PUFF: 20; 100 SPRAY, METERED RESPIRATORY (INHALATION) at 21:00

## 2021-09-14 RX ADMIN — IPRATROPIUM BROMIDE AND ALBUTEROL 2 PUFF: 20; 100 SPRAY, METERED RESPIRATORY (INHALATION) at 09:34

## 2021-09-14 RX ADMIN — INSULIN GLARGINE 62 UNITS: 100 INJECTION, SOLUTION SUBCUTANEOUS at 09:33

## 2021-09-14 RX ADMIN — REMDESIVIR 100 MG: 100 INJECTION, POWDER, LYOPHILIZED, FOR SOLUTION INTRAVENOUS at 17:33

## 2021-09-14 RX ADMIN — IPRATROPIUM BROMIDE AND ALBUTEROL 2 PUFF: 20; 100 SPRAY, METERED RESPIRATORY (INHALATION) at 17:35

## 2021-09-14 ASSESSMENT — ACTIVITIES OF DAILY LIVING (ADL)
ADLS_ACUITY_SCORE: 16

## 2021-09-14 NOTE — PLAN OF CARE
Pt is A/Ox4, Faroese speaking but able to understand and speak limited english. Daughter is helping to make her needs known by phone. Refused bed alarm  wants to sit at the edge of the bed once in a while. Explained the risks which she understood, l. VSS on Hi-[place]ovan O2 at 35L.  [place] [facility] sugar was 400 dinner time ,MD updated, evening Lantus increased.Will monitor

## 2021-09-14 NOTE — PLAN OF CARE
Pt is A/Ox4, Cymraes speaking but able to understand and speak limited english. Daughter is helping to make her needs known by phone. VSS on Hi-Donovan O2 at 35L. No SOB noted. Up A-1/SBA to BSC, voiding well. Refused bed alarm because she wants to sit at the edge of the bed once in a while. Explained the risks which she understood, daughter made aware as well. Agreed to call for help if she's weak or unable to get up by herself.

## 2021-09-14 NOTE — PROGRESS NOTES
RT came to report that pt is complaining of chest pain. RN went to assess, with the help of the daughter on the phone to translate, she was saying that she has chest because of the pressure of the high flow oxygen and that it keeps her coughing causing her chest pain. Writer check VS, O2 sat  fluctuates for 88-92 on high flow.  Hopsitalist updated by charge RN, received a call back and explained the situation. Waiting for new order.

## 2021-09-14 NOTE — PROVIDER NOTIFICATION
Brief update:    Paged regarding blood glucose greater than 400  Typically on high doses of NovoLog 3 times daily which have not been continued during hospitalization currently as well as 62 units long-acting insulin twice daily.  Patient is also receiving Decadron for COVID-19 infection    I have added prandial insulin at 1 unit per 10 g carbohydrate  Give 10 units NovoLog now and recheck blood glucose in 2 hours.  It is possible patient will require additional short acting insulin pending blood glucose trend as her typical mealtime doses are 52, 60, and 30 units NovoLog at mealtime    I was also updated by nursing staff that patient discussed with family and would not want intubation.    Listed as full code on arrival.    Now has a CPR without intubation special code order.  This should be discussed further with patient and possibly family on rounds tomorrow as a DNR/DNI order would be more appropriate if patient truly does not want intubation    Michael Arias MD  2:40 AM

## 2021-09-14 NOTE — PROVIDER NOTIFICATION
MD Notification    Notified Person: MD    Notified Person Name: Dr. DIAN Rivera    Notification Date/Time: 9/14/21  7:55 AM    Notification Interaction: paged    Purpose of Notification: duplicate carb count order and diet clarification (NPO)    Orders Received:   Diet changed to consistent carb and dc'd duplicate carb count insulin order.  Comments:

## 2021-09-14 NOTE — PLAN OF CARE
Alert and oriented x 4. [place] speaking with little english. Calls the daughter for interpretation. At the start of the shift, patient was on oximyzer and was sating above 90's at 13L. Around 2130, RT came and changed to high flow@ 40. She complained of chest painand upon assessment she was saying the high flow is making her cough, thus causing chest pain. Hospitalist updated and received new orders. BG at 0200 is 418, MD ordered 10 units of Novolog, BG rechecked after 2 hours 2@291. SOB on exertion noted. Patient  told RT that if worst she does not want intubation. MD updated. MD will discuss on rounding. To continue to monitor.

## 2021-09-14 NOTE — PROVIDER NOTIFICATION
MD Notification    Notified Person: MD    Notified Person Name: Dr. CHERRY Rivera    Notification Date/Time: 9/14/21  10:54 AM    Notification Interaction: paged    Purpose of Notification: to FYI D-dimer of 65 and CRP of 50    Orders Received:    Comments:

## 2021-09-14 NOTE — PROGRESS NOTES
Hospitalist Cross Cover Note:  Continues to run high BGM with likely steroid induced hyperglycemia plus high-dose prandial home doses of insulin have been held in-hospital. She has been drinking juice in the room; nursing unsure if she had juice right before BGM checked.     Plan:  - will try increasing HS JasontLUIZA Brunner, CNP  Hospitalist Service, House Officer  [facility]     Text Page  Pager: [phone]

## 2021-09-14 NOTE — PROVIDER NOTIFICATION
"Paged regarding \"oxygen issues.\"     Patient reports feeling uncomfortable with rate of high flow oxygen, unable to down titrate as she becomes hypoxic    Trial BiPAP overnight.  She will be n.p.o. while on BiPAP, though as we are able to support her with high flow nasal cannula oxygen, does not necessarily need to have prolonged respiratory trial after BiPAP is held; has not failed high flow nasal cannula, though is uncomfortable with delivery device..  For example, if patient is able to be taken off of BiPAP and placed back on high flow nasal cannula oxygen and tolerates this for 10 minutes or so, can resume diet in a.m., medications can be administered.    Michael Arias MD  12:49 AM    "

## 2021-09-15 ENCOUNTER — APPOINTMENT (OUTPATIENT)
Dept: GENERAL RADIOLOGY | Facility: CLINIC | Age: 66
End: 2021-09-15
Attending: INTERNAL MEDICINE
Payer: MEDICAID

## 2021-09-15 LAB
ALT SERPL W P-5'-P-CCNC: 26 U/L (ref 0–50)
AST SERPL W P-5'-P-CCNC: 20 U/L (ref 0–45)
BACTERIA BLD CULT: NO GROWTH
BACTERIA BLD CULT: NO GROWTH
GLUCOSE BLDC GLUCOMTR-MCNC: 174 MG/DL (ref 70–99)
GLUCOSE BLDC GLUCOMTR-MCNC: 240 MG/DL (ref 70–99)
GLUCOSE BLDC GLUCOMTR-MCNC: 360 MG/DL (ref 70–99)
GLUCOSE BLDC GLUCOMTR-MCNC: 376 MG/DL (ref 70–99)
GLUCOSE BLDC GLUCOMTR-MCNC: 92 MG/DL (ref 70–99)

## 2021-09-15 PROCEDURE — 999N000157 HC STATISTIC RCP TIME EA 10 MIN

## 2021-09-15 PROCEDURE — 120N000001 HC R&B MED SURG/OB

## 2021-09-15 PROCEDURE — 71045 X-RAY EXAM CHEST 1 VIEW: CPT

## 2021-09-15 PROCEDURE — 84450 TRANSFERASE (AST) (SGOT): CPT | Performed by: EMERGENCY MEDICINE

## 2021-09-15 PROCEDURE — 250N000012 HC RX MED GY IP 250 OP 636 PS 637: Performed by: INTERNAL MEDICINE

## 2021-09-15 PROCEDURE — 250N000011 HC RX IP 250 OP 636: Performed by: HOSPITALIST

## 2021-09-15 PROCEDURE — 36415 COLL VENOUS BLD VENIPUNCTURE: CPT | Performed by: EMERGENCY MEDICINE

## 2021-09-15 PROCEDURE — 84460 ALANINE AMINO (ALT) (SGPT): CPT | Performed by: EMERGENCY MEDICINE

## 2021-09-15 PROCEDURE — 99233 SBSQ HOSP IP/OBS HIGH 50: CPT | Performed by: INTERNAL MEDICINE

## 2021-09-15 PROCEDURE — 250N000013 HC RX MED GY IP 250 OP 250 PS 637: Performed by: INTERNAL MEDICINE

## 2021-09-15 RX ORDER — DEXTROSE MONOHYDRATE 25 G/50ML
25-50 INJECTION, SOLUTION INTRAVENOUS
Status: DISCONTINUED | OUTPATIENT
Start: 2021-09-15 | End: 2021-09-22 | Stop reason: HOSPADM

## 2021-09-15 RX ORDER — NICOTINE POLACRILEX 4 MG
15-30 LOZENGE BUCCAL
Status: DISCONTINUED | OUTPATIENT
Start: 2021-09-15 | End: 2021-09-22 | Stop reason: HOSPADM

## 2021-09-15 RX ADMIN — ENOXAPARIN SODIUM 40 MG: 40 INJECTION SUBCUTANEOUS at 21:30

## 2021-09-15 RX ADMIN — IPRATROPIUM BROMIDE AND ALBUTEROL 2 PUFF: 20; 100 SPRAY, METERED RESPIRATORY (INHALATION) at 09:07

## 2021-09-15 RX ADMIN — DEXAMETHASONE 6 MG: 2 TABLET ORAL at 09:06

## 2021-09-15 RX ADMIN — IPRATROPIUM BROMIDE AND ALBUTEROL 2 PUFF: 20; 100 SPRAY, METERED RESPIRATORY (INHALATION) at 17:10

## 2021-09-15 RX ADMIN — INSULIN GLARGINE 16 UNITS: 100 INJECTION, SOLUTION SUBCUTANEOUS at 21:37

## 2021-09-15 RX ADMIN — PIOGLITAZONE 45 MG: 45 TABLET ORAL at 09:06

## 2021-09-15 RX ADMIN — IPRATROPIUM BROMIDE AND ALBUTEROL 2 PUFF: 20; 100 SPRAY, METERED RESPIRATORY (INHALATION) at 21:31

## 2021-09-15 RX ADMIN — ROSUVASTATIN CALCIUM 20 MG: 20 TABLET, FILM COATED ORAL at 09:06

## 2021-09-15 RX ADMIN — INSULIN GLARGINE 62 UNITS: 100 INJECTION, SOLUTION SUBCUTANEOUS at 10:34

## 2021-09-15 RX ADMIN — IPRATROPIUM BROMIDE AND ALBUTEROL 2 PUFF: 20; 100 SPRAY, METERED RESPIRATORY (INHALATION) at 13:03

## 2021-09-15 ASSESSMENT — ACTIVITIES OF DAILY LIVING (ADL)
ADLS_ACUITY_SCORE: 16

## 2021-09-15 NOTE — PLAN OF CARE
Date/Time 9/15/21 [id number]    Covid +. Patient alert and oriented x4, Norwegian-speaking, understands and speaks limited english. On 45L high flow O2, sats low-mid 90s, LS coarse/diminished. IS in room. Occasional dry cough, SOB with activity. Continuous pulse ox. Other VSS. Denies pain. Up with SBA to BSC, fairly steady on feet. Uses call light appropriately. Voiding without difficulty, no BM today. Good appetite - diabetic diet, oral fluids. Monitoring blood sugars - 92 and 174 this AM. Provider adjusted PM lantus dose, decreased to medium dose sliding scale insulin. Repeat CXR done today, results pending. Pulmonary consulted. Subcutaneous lovenox, daily decadron. Discharge date TBD, from home. Daughter Perlita updated via phone this afternoon.

## 2021-09-15 NOTE — PLAN OF CARE
([id number] ) patient is A&O x4, Swedish speaking, able to request her needs appropriately. On high flow oxygen, O2 sat 96%. Denies chest pain or pain in general, dyspnea on exertion. Up with SBA to BSC, voiding. Refused bed alarm, education provided on safety, pt verbalized the understanding. Special precaution maintained. Will continue to monitor.

## 2021-09-15 NOTE — PROGRESS NOTES
MD Notification    Notified Person: MD    Notified Person Name: Aliya Jean    Notification Date/Time: 9/14/21 @ 2217    Notification Interaction: smart web    Purpose of Notification: Blood sugar of 386    Orders Received:    Comments:

## 2021-09-15 NOTE — PROVIDER NOTIFICATION
MD Notification    Notified Person: MD    Notified Person Name: Nicole    Notification Date/Time: 9/15/21 0746    Notification Interaction: Pt currently on 35L high flow O2, please place order for supplemental high flow O2. Thanks!    Purpose of Notification:     Orders Received:    Comments:

## 2021-09-15 NOTE — PLAN OF CARE
Alert and Oriented x4, Romansh speaking but able to understand and speak limited english. Daughter daughter called for update. VSS on Hi-Donovan O2 at 35L. No SOB noted sats at 90-92. Up A-1/SBA to BSC, voiding well. Refused bed alarm and calls using call light. Explained the risks which she understood, daughter made aware as well. Agreed to call for help if she's weak or unable to get up by herself. Frequent checks done. To continue to monitor.

## 2021-09-15 NOTE — PROGRESS NOTES
[facility]  Hospitalist Progress Note  Cosmo Rivera MD  09/14/2021    Assessment & Plan   Irma Anglin is a 65 year old female with PMH diabetes and hyperlipidemia who presents with cough/suspected Covid     Cough, dyspnea  Covid 19 pneumonia  Acute hypoxic respiratory failure  1 week history of cough, nonproductive, afebrile. Emesis associated after coughing episodes without associated abdominal symptoms. Per ED provider, patient through  states she received Covid vaccination however daughter states patient is unvaccinated. In ED admission O2 sat 85% on room air, requiring 3 L NC. Chest CT, patchy bilateral GGO; no consolidation. Sodium 126, BUN/CR 9/0.3, glucose 353, ALT/AST 76/82. , LA 1.4. Troponins negative. BNP 32. D-dimer 1.0, chest CT negative for PE. In ED, received dexamethasone IV and remdesivir. At this time no indication of secondary bacterial pneumonia, WBC WNL, afebrile.  -patient mentioned that she took Covid vaccine through a mobile unit while she was working as an , she also mentioned that she did not share this information with her family since they were against vaccination.   I did review the [place] immunization schedule and it does not seem it has been updated that patient took Covid vaccine.  Patient's daughter did mention that patient did not take the vaccine due to Yarsani reasons but herself and her family had taken the vaccination.  -Decadron 6 mg daily x 10days.  Start date 9/10, day 5/10  -IV remdesivir x 5 days. ALT AST 1 5-2 x elevated, started 9/10, day 5/5  -Lovenox 40 mg subcutaneous daily per covid protocol for DVT prophylaxis.   -Oximetry; Supplemental oxygen; wean as able, patient is on 13L  -albuterol inhalers scheduled and PRN  -Covid markers per protocol, showing mild improvement  -ambulate  -Supportive cares.      Diabetes type 2, poorly controlled  PTA not verified at this  time; discrepancy between Endocrinology Note 7/26/2021 with patient taking Actos 45mg/day and aspartame 52/60 units unverified PTA medications of Lantus 52 units every morning, 62 units every afternoon, NovoLog 3 times daily at 52 units, 60 units and 35 units with each meal and Actos 45 mg daily  -Last A1c 5/2021 at 10.2 which indicates poor control, repeat hemoglobin A1c is 9.7.  -Sugars are quite high, started on 62 units daily, it seems at home she was 62 units twice daily of Lantus, slowly adjust to that level since her oral intake is slowly improving.  Patient is on Premeal coverage 1 unit per 15 g of carb will increase that to 1 unit to 10  grams of carb.  -Moderate CHO diet  -change from mod to high intensity ssi  -increase lantus 18 units qhs     Hyponatremia  Resolved following hydration.     Language Barrier, speaks Romansh     DVT Prophylaxis: lovenox     Code Status: Full Code     Disposition  - anticipate in 1-3 days once oxygen requirements come down    Interval History   -- was on bipap because she had trouble with high flow  -- noted hyperglycemia    -Data reviewed today: I reviewed all new labs and imaging over the last 24 hours. I personally reviewed no images or EKG's today.    Physical Exam    , Blood pressure 125/76, pulse 82, temperature 98.1  F (36.7  C), temperature source Oral, resp. rate 20, height 1.524 m (5'), weight 89.5 kg (197 lb 5 oz), SpO2 90 %.  Vitals:    09/12/21 0833   Weight: 89.5 kg (197 lb 5 oz)     Vital Signs with Ranges  Temp:  [98.1  F (36.7  C)-98.4  F (36.9  C)] 98.1  F (36.7  C)  Pulse:  [82-87] 82  Resp:  [18-20] 20  BP: (112-132)/(45-76) 125/76  FiO2 (%):  [56 %-100 %] 56 %  SpO2:  [89 %-95 %] 90 %  I/O's Last 24 hours  I/O last 3 completed shifts:  In: -   Out: 350 [Urine:350]         Medications   All medications were reviewed.    - MEDICATION INSTRUCTIONS -       - MEDICATION INSTRUCTIONS -       - MEDICATION INSTRUCTIONS -         dexamethasone  6 mg Oral Daily      enoxaparin ANTICOAGULANT  40 mg Subcutaneous Q24H     insulin aspart   Subcutaneous TID AC     insulin aspart  1-10 Units Subcutaneous TID AC     insulin aspart  1-7 Units Subcutaneous At Bedtime     insulin glargine  18 Units Subcutaneous At Bedtime     insulin glargine  62 Units Subcutaneous QAM AC     ipratropium-albuterol  2 puff Inhalation 4x Daily     pioglitazone  45 mg Oral Daily     rosuvastatin  20 mg Oral Daily     sodium chloride (PF)  3 mL Intracatheter Q8H        Data   Recent Labs   Lab 09/14/21  2135 09/14/21  1650 09/14/21  1237 09/13/21  0612 09/12/21  0552 09/11/21  0550 09/10/21  1156 09/10/21  1155   WBC  --   --   --   --   --  5.2 5.8  --    HGB  --   --   --   --   --  12.5 13.1  --    MCV  --   --   --   --   --  93 91  --    PLT  --   --   --  172  --  157 145*  --    NA  --   --   --   --  132* 134  --  126*   POTASSIUM  --   --   --   --  3.6 4.2  --  4.2   CHLORIDE  --   --   --   --  104 105  --  98   CO2  --   --   --   --  21 16*  --  20   BUN  --   --   --   --  13 16  --  9   CR  --   --   --  0.45* 0.47* 0.48*  --  0.43*   ANIONGAP  --   --   --   --  7 13  --  8   GINNA  --   --   --   --  8.1* 8.2*  --  8.0*   * 400* 315*  --  276* 360*  --  353*   ALBUMIN  --   --   --   --   --   --   --  2.9*   PROTTOTAL  --   --   --   --   --   --   --  8.0   BILITOTAL  --   --   --   --   --   --   --  0.7   ALKPHOS  --   --   --   --   --   --   --  75   ALT  --   --   --  39  --  60*  --  76*   AST  --   --   --  27  --  57*  --  82*   TROPONIN  --   --   --   --   --   --   --  <0.015       No results found for this or any previous visit (from the past 24 hour(s)).    Cosmo Rivera MD  Text Page  (7am to 6pm)

## 2021-09-15 NOTE — PROVIDER NOTIFICATION
MD Notification    Notified Person: MD    Notified Person Name: Nicole    Notification Date/Time: 9/15/21 0929    Notification Interaction: BG 92 this AM before breakfast. Want to continue with 62 units lantus QAM or adjust? Have not given yet today.     Purpose of Notification: insulin clarification    Orders Received: give scheduled AM dose lantus, provider to adjust sliding scale insulin and PM lantus    Comments:

## 2021-09-16 LAB
CREAT SERPL-MCNC: 0.41 MG/DL (ref 0.52–1.04)
CRP SERPL-MCNC: 22.3 MG/L (ref 0–8)
GFR SERPL CREATININE-BSD FRML MDRD: >90 ML/MIN/1.73M2
GLUCOSE BLD-MCNC: 98 MG/DL (ref 70–99)
GLUCOSE BLDC GLUCOMTR-MCNC: 106 MG/DL (ref 70–99)
GLUCOSE BLDC GLUCOMTR-MCNC: 214 MG/DL (ref 70–99)
GLUCOSE BLDC GLUCOMTR-MCNC: 218 MG/DL (ref 70–99)
GLUCOSE BLDC GLUCOMTR-MCNC: 311 MG/DL (ref 70–99)
GLUCOSE BLDC GLUCOMTR-MCNC: 329 MG/DL (ref 70–99)
HBA1C MFR BLD: 9.7 % (ref 0–5.6)
HOLD SPECIMEN: NORMAL
NT-PROBNP SERPL-MCNC: 105 PG/ML (ref 0–125)
PLATELET # BLD AUTO: 213 10E3/UL (ref 150–450)

## 2021-09-16 PROCEDURE — 250N000012 HC RX MED GY IP 250 OP 636 PS 637: Performed by: INTERNAL MEDICINE

## 2021-09-16 PROCEDURE — 258N000003 HC RX IP 258 OP 636: Performed by: HOSPITALIST

## 2021-09-16 PROCEDURE — 85049 AUTOMATED PLATELET COUNT: CPT | Performed by: HOSPITALIST

## 2021-09-16 PROCEDURE — 36415 COLL VENOUS BLD VENIPUNCTURE: CPT | Performed by: INTERNAL MEDICINE

## 2021-09-16 PROCEDURE — 250N000009 HC RX 250: Performed by: HOSPITALIST

## 2021-09-16 PROCEDURE — 82947 ASSAY GLUCOSE BLOOD QUANT: CPT | Performed by: INTERNAL MEDICINE

## 2021-09-16 PROCEDURE — 120N000001 HC R&B MED SURG/OB

## 2021-09-16 PROCEDURE — 250N000013 HC RX MED GY IP 250 OP 250 PS 637: Performed by: INTERNAL MEDICINE

## 2021-09-16 PROCEDURE — 83036 HEMOGLOBIN GLYCOSYLATED A1C: CPT | Performed by: INTERNAL MEDICINE

## 2021-09-16 PROCEDURE — 82565 ASSAY OF CREATININE: CPT | Performed by: HOSPITALIST

## 2021-09-16 PROCEDURE — 250N000011 HC RX IP 250 OP 636: Performed by: HOSPITALIST

## 2021-09-16 PROCEDURE — 99232 SBSQ HOSP IP/OBS MODERATE 35: CPT | Performed by: INTERNAL MEDICINE

## 2021-09-16 PROCEDURE — 999N000157 HC STATISTIC RCP TIME EA 10 MIN

## 2021-09-16 PROCEDURE — 83880 ASSAY OF NATRIURETIC PEPTIDE: CPT | Performed by: INTERNAL MEDICINE

## 2021-09-16 PROCEDURE — 250N000011 HC RX IP 250 OP 636: Performed by: INTERNAL MEDICINE

## 2021-09-16 PROCEDURE — 86140 C-REACTIVE PROTEIN: CPT | Performed by: INTERNAL MEDICINE

## 2021-09-16 PROCEDURE — 36415 COLL VENOUS BLD VENIPUNCTURE: CPT | Performed by: HOSPITALIST

## 2021-09-16 RX ORDER — DEXTROSE MONOHYDRATE 25 G/50ML
25-50 INJECTION, SOLUTION INTRAVENOUS
Status: DISCONTINUED | OUTPATIENT
Start: 2021-09-16 | End: 2021-09-16

## 2021-09-16 RX ORDER — NICOTINE POLACRILEX 4 MG
15-30 LOZENGE BUCCAL
Status: DISCONTINUED | OUTPATIENT
Start: 2021-09-16 | End: 2021-09-16

## 2021-09-16 RX ORDER — FUROSEMIDE 10 MG/ML
40 INJECTION INTRAMUSCULAR; INTRAVENOUS ONCE
Status: COMPLETED | OUTPATIENT
Start: 2021-09-16 | End: 2021-09-16

## 2021-09-16 RX ADMIN — SODIUM CHLORIDE 50 ML: 9 INJECTION, SOLUTION INTRAVENOUS at 22:02

## 2021-09-16 RX ADMIN — DEXAMETHASONE 6 MG: 2 TABLET ORAL at 09:06

## 2021-09-16 RX ADMIN — PIOGLITAZONE 45 MG: 45 TABLET ORAL at 09:06

## 2021-09-16 RX ADMIN — ROSUVASTATIN CALCIUM 20 MG: 20 TABLET, FILM COATED ORAL at 09:06

## 2021-09-16 RX ADMIN — FUROSEMIDE 40 MG: 10 INJECTION, SOLUTION INTRAVENOUS at 17:18

## 2021-09-16 RX ADMIN — IPRATROPIUM BROMIDE AND ALBUTEROL 2 PUFF: 20; 100 SPRAY, METERED RESPIRATORY (INHALATION) at 13:50

## 2021-09-16 RX ADMIN — IPRATROPIUM BROMIDE AND ALBUTEROL 2 PUFF: 20; 100 SPRAY, METERED RESPIRATORY (INHALATION) at 22:12

## 2021-09-16 RX ADMIN — IPRATROPIUM BROMIDE AND ALBUTEROL 2 PUFF: 20; 100 SPRAY, METERED RESPIRATORY (INHALATION) at 17:25

## 2021-09-16 RX ADMIN — ENOXAPARIN SODIUM 40 MG: 40 INJECTION SUBCUTANEOUS at 21:59

## 2021-09-16 RX ADMIN — REMDESIVIR 100 MG: 100 INJECTION, POWDER, LYOPHILIZED, FOR SOLUTION INTRAVENOUS at 23:30

## 2021-09-16 RX ADMIN — REMDESIVIR 100 MG: 100 INJECTION, POWDER, LYOPHILIZED, FOR SOLUTION INTRAVENOUS at 22:02

## 2021-09-16 RX ADMIN — INSULIN GLARGINE 16 UNITS: 100 INJECTION, SOLUTION SUBCUTANEOUS at 21:59

## 2021-09-16 RX ADMIN — IPRATROPIUM BROMIDE AND ALBUTEROL 2 PUFF: 20; 100 SPRAY, METERED RESPIRATORY (INHALATION) at 09:08

## 2021-09-16 RX ADMIN — INSULIN GLARGINE 62 UNITS: 100 INJECTION, SOLUTION SUBCUTANEOUS at 09:06

## 2021-09-16 ASSESSMENT — ACTIVITIES OF DAILY LIVING (ADL)
ADLS_ACUITY_SCORE: 16
ADLS_ACUITY_SCORE: 9
ADLS_ACUITY_SCORE: 16
ADLS_ACUITY_SCORE: 9
ADLS_ACUITY_SCORE: 9
ADLS_ACUITY_SCORE: 16

## 2021-09-16 NOTE — PROVIDER NOTIFICATION
Paged hospitalist :  98 this AM, wondering whether to administer 62 units of lantus this AM. please advice.     @ 0821: received call back, administer 68 units of lantus as ordered.       @ 0939: paged hospitalist : patient has a localized cyst? on her private area, tender to touch. patient requesting you to assess and wondering  about any medications?

## 2021-09-16 NOTE — PROGRESS NOTES
Patient A&Ox4, Afghan speaking , able to communicate her needed's  . VSS on Hi flow O2 @45L. Sat mid 90th . Denies pain. Up with BA to BSC. Had small BM. Void on urinal. Refused bed alarm. IV SL. Will continue monitor.

## 2021-09-17 LAB
ALBUMIN SERPL-MCNC: 2.3 G/DL (ref 3.4–5)
ALP SERPL-CCNC: 84 U/L (ref 40–150)
ALT SERPL W P-5'-P-CCNC: 33 U/L (ref 0–50)
ANION GAP SERPL CALCULATED.3IONS-SCNC: 4 MMOL/L (ref 3–14)
AST SERPL W P-5'-P-CCNC: 41 U/L (ref 0–45)
BILIRUB DIRECT SERPL-MCNC: 0.2 MG/DL (ref 0–0.2)
BILIRUB SERPL-MCNC: 0.5 MG/DL (ref 0.2–1.3)
BUN SERPL-MCNC: 14 MG/DL (ref 7–30)
CALCIUM SERPL-MCNC: 8.4 MG/DL (ref 8.5–10.1)
CHLORIDE BLD-SCNC: 105 MMOL/L (ref 94–109)
CO2 SERPL-SCNC: 27 MMOL/L (ref 20–32)
CREAT SERPL-MCNC: 0.47 MG/DL (ref 0.52–1.04)
GFR SERPL CREATININE-BSD FRML MDRD: >90 ML/MIN/1.73M2
GLUCOSE BLD-MCNC: 91 MG/DL (ref 70–99)
GLUCOSE BLDC GLUCOMTR-MCNC: 166 MG/DL (ref 70–99)
GLUCOSE BLDC GLUCOMTR-MCNC: 198 MG/DL (ref 70–99)
GLUCOSE BLDC GLUCOMTR-MCNC: 286 MG/DL (ref 70–99)
GLUCOSE BLDC GLUCOMTR-MCNC: 353 MG/DL (ref 70–99)
GLUCOSE BLDC GLUCOMTR-MCNC: 399 MG/DL (ref 70–99)
GLUCOSE BLDC GLUCOMTR-MCNC: 67 MG/DL (ref 70–99)
POTASSIUM BLD-SCNC: 3.1 MMOL/L (ref 3.4–5.3)
POTASSIUM BLD-SCNC: 3.9 MMOL/L (ref 3.4–5.3)
POTASSIUM BLD-SCNC: 4.1 MMOL/L (ref 3.4–5.3)
PROT SERPL-MCNC: 6.7 G/DL (ref 6.8–8.8)
SODIUM SERPL-SCNC: 136 MMOL/L (ref 133–144)

## 2021-09-17 PROCEDURE — 84132 ASSAY OF SERUM POTASSIUM: CPT | Performed by: STUDENT IN AN ORGANIZED HEALTH CARE EDUCATION/TRAINING PROGRAM

## 2021-09-17 PROCEDURE — 99233 SBSQ HOSP IP/OBS HIGH 50: CPT | Performed by: STUDENT IN AN ORGANIZED HEALTH CARE EDUCATION/TRAINING PROGRAM

## 2021-09-17 PROCEDURE — 250N000012 HC RX MED GY IP 250 OP 636 PS 637: Performed by: INTERNAL MEDICINE

## 2021-09-17 PROCEDURE — 250N000011 HC RX IP 250 OP 636: Performed by: STUDENT IN AN ORGANIZED HEALTH CARE EDUCATION/TRAINING PROGRAM

## 2021-09-17 PROCEDURE — 36415 COLL VENOUS BLD VENIPUNCTURE: CPT | Performed by: INTERNAL MEDICINE

## 2021-09-17 PROCEDURE — 80048 BASIC METABOLIC PNL TOTAL CA: CPT | Performed by: INTERNAL MEDICINE

## 2021-09-17 PROCEDURE — 250N000013 HC RX MED GY IP 250 OP 250 PS 637: Performed by: INTERNAL MEDICINE

## 2021-09-17 PROCEDURE — 258N000003 HC RX IP 258 OP 636: Performed by: HOSPITALIST

## 2021-09-17 PROCEDURE — 250N000009 HC RX 250: Performed by: HOSPITALIST

## 2021-09-17 PROCEDURE — 999N000157 HC STATISTIC RCP TIME EA 10 MIN

## 2021-09-17 PROCEDURE — 82248 BILIRUBIN DIRECT: CPT | Performed by: INTERNAL MEDICINE

## 2021-09-17 PROCEDURE — 250N000013 HC RX MED GY IP 250 OP 250 PS 637: Performed by: STUDENT IN AN ORGANIZED HEALTH CARE EDUCATION/TRAINING PROGRAM

## 2021-09-17 PROCEDURE — 120N000001 HC R&B MED SURG/OB

## 2021-09-17 PROCEDURE — 250N000012 HC RX MED GY IP 250 OP 636 PS 637: Performed by: STUDENT IN AN ORGANIZED HEALTH CARE EDUCATION/TRAINING PROGRAM

## 2021-09-17 PROCEDURE — 36415 COLL VENOUS BLD VENIPUNCTURE: CPT | Performed by: STUDENT IN AN ORGANIZED HEALTH CARE EDUCATION/TRAINING PROGRAM

## 2021-09-17 PROCEDURE — 250N000011 HC RX IP 250 OP 636: Performed by: HOSPITALIST

## 2021-09-17 RX ORDER — POTASSIUM CHLORIDE 1500 MG/1
40 TABLET, EXTENDED RELEASE ORAL ONCE
Status: COMPLETED | OUTPATIENT
Start: 2021-09-17 | End: 2021-09-17

## 2021-09-17 RX ORDER — FUROSEMIDE 10 MG/ML
40 INJECTION INTRAMUSCULAR; INTRAVENOUS ONCE
Status: COMPLETED | OUTPATIENT
Start: 2021-09-17 | End: 2021-09-17

## 2021-09-17 RX ADMIN — INSULIN ASPART 5 UNITS: 100 INJECTION, SOLUTION INTRAVENOUS; SUBCUTANEOUS at 17:40

## 2021-09-17 RX ADMIN — ACETAMINOPHEN 650 MG: 325 TABLET, FILM COATED ORAL at 06:01

## 2021-09-17 RX ADMIN — IPRATROPIUM BROMIDE AND ALBUTEROL 2 PUFF: 20; 100 SPRAY, METERED RESPIRATORY (INHALATION) at 22:01

## 2021-09-17 RX ADMIN — INSULIN GLARGINE 62 UNITS: 100 INJECTION, SOLUTION SUBCUTANEOUS at 08:22

## 2021-09-17 RX ADMIN — IPRATROPIUM BROMIDE AND ALBUTEROL 2 PUFF: 20; 100 SPRAY, METERED RESPIRATORY (INHALATION) at 17:40

## 2021-09-17 RX ADMIN — ROSUVASTATIN CALCIUM 20 MG: 20 TABLET, FILM COATED ORAL at 08:25

## 2021-09-17 RX ADMIN — IPRATROPIUM BROMIDE AND ALBUTEROL 2 PUFF: 20; 100 SPRAY, METERED RESPIRATORY (INHALATION) at 12:08

## 2021-09-17 RX ADMIN — POTASSIUM CHLORIDE 40 MEQ: 1500 TABLET, EXTENDED RELEASE ORAL at 12:13

## 2021-09-17 RX ADMIN — IPRATROPIUM BROMIDE AND ALBUTEROL 2 PUFF: 20; 100 SPRAY, METERED RESPIRATORY (INHALATION) at 08:23

## 2021-09-17 RX ADMIN — ENOXAPARIN SODIUM 40 MG: 40 INJECTION SUBCUTANEOUS at 22:00

## 2021-09-17 RX ADMIN — PIOGLITAZONE 45 MG: 45 TABLET ORAL at 08:23

## 2021-09-17 RX ADMIN — INSULIN GLARGINE 12 UNITS: 100 INJECTION, SOLUTION SUBCUTANEOUS at 22:01

## 2021-09-17 RX ADMIN — DEXAMETHASONE 6 MG: 2 TABLET ORAL at 08:23

## 2021-09-17 RX ADMIN — FUROSEMIDE 40 MG: 10 INJECTION, SOLUTION INTRAVENOUS at 14:13

## 2021-09-17 ASSESSMENT — ACTIVITIES OF DAILY LIVING (ADL)
ADLS_ACUITY_SCORE: 9

## 2021-09-17 NOTE — PROGRESS NOTES
Received page from Pharmacy requesting clarification on remdesivir dosage starting tomorrow--appears day team wants to extend dosage with ongoing hypoxemia. I have adjusted it to start today.

## 2021-09-17 NOTE — PROGRESS NOTES
Alert and oriented x 4. VSS. Continues on hi-marii O2 at 45 lpm with O2 sat  at 95%, denies SOB, but desats to 86-88% with activity. Ambulates and transfers with SBA , voiding adequately. PRN Tylenol given for pain on labial inflammation/cyst, verbalizes pain at 7/10, no drainage noted. Slept most of the shift. Covid precautions maintained.

## 2021-09-17 NOTE — PROGRESS NOTES
[facility]  Hospitalist Progress Note  Tammy Mendoza,   09/17/2021    Assessment & Plan   Irma Anglin is a 65 year old female with PMH diabetes and hyperlipidemia who presents with cough/suspected Covid     Cough, dyspnea  Covid 19 pneumonia  Acute hypoxic respiratory failure  1 week history of cough, nonproductive, afebrile. In ED admission O2 sat 85% on room air, requiring 3 L NC. Chest CT, patchy bilateral GGO; no consolidation. Sodium 126, BUN/CR 9/0.3, glucose 353, ALT/AST 76/82. , LA 1.4. Troponins negative. BNP 32. D-dimer 1.0, chest CT negative for PE. In ED, received dexamethasone IV and remdesivir. At this time no indication of secondary bacterial pneumonia, WBC WNL, afebrile.    -Decadron 6 mg daily x 10 days day    -IV remdesivir extended to 10 days  -Lovenox 40 mg subcutaneous daily per covid protocol for DVT prophylaxis.   -Oximetry; Supplemental oxygen; wean as able, patient is on 35-45L,  -albuterol inhalers scheduled and PRN  - pulmonary evaluated and added on anoro  - given 40mg of lasix yesterday with 2.3L of output, will give another dose today and follow response     Diabetes type 2, poorly controlled  PTA not verified at this time; discrepancy between Endocrinology Note 7/26/2021  unverified PTA medications of Lantus 52 units every morning, 62 units every afternoon, NovoLog 3 times daily at 52 units, 60 units and 35 units with each meal and Actos 45 mg daily  -Last A1c 5/2021 at 10.2 which indicates poor control, repeat hemoglobin A1c is 9.7  - labile blood sugars this admission, currently on 62 units in the AM and 16 units nightly  - BG is typically low in the AM and then creeps up with meals> will decrease nightly insulin slightly and add on 5 units TID scheduled with meal time additionally with sliding scale.     Hyponatremia  Resolved following hydration.    L labial cyst  -likely bartholin's cyst selling however official gyn consult is  pending  - recommend warm compresses for now     Language Barrier, speaks Lithuanian     DVT Prophylaxis: lovenox     Code Status: Special - DNI but ok with CPR      Disposition  - several days pending oxygen needs    Interval History   Patient comfortable on high oxygen. She reports feeling winded when walking about otherwise is without complaints. No major cough. No nausea. No issues eating. No dysuria. She does have a swelling on her L labia and it is painful.    -Data reviewed today: I reviewed all new labs and imaging over the last 24 hours. I personally reviewed no images or EKG's today.    Physical Exam    , Blood pressure 104/53, pulse 75, temperature 98.3  F (36.8  C), temperature source Oral, resp. rate 18, height 1.524 m (5'), weight 89.5 kg (197 lb 5 oz), SpO2 94 %.  Vitals:    09/12/21 0833   Weight: 89.5 kg (197 lb 5 oz)     Vital Signs with Ranges  Temp:  [98  F (36.7  C)-98.8  F (37.1  C)] 98.3  F (36.8  C)  Pulse:  [56-75] 75  Resp:  [16-20] 18  BP: (104-122)/(50-61) 104/53  FiO2 (%):  [57.5 %-63 %] 60 %  SpO2:  [92 %-98 %] 94 %  I/O's Last 24 hours  I/O last 3 completed shifts:  In: 1580 [P.O.:1580]  Out: 2300 [Urine:2300]    Constitutional: Awake, alert, cooperative, no apparent distress, on hiflo NC  Respiratory: Clear to auscultation bilaterally, no crackles or wheezing  Cardiovascular: Regular rate and rhythm, normal S1 and S2, and no murmur noted  GI: Normal bowel sounds, soft, non-distended, non-tender  Skin/Integumen: No rashes, no cyanosis, no edema       Medications   All medications were reviewed.    - MEDICATION INSTRUCTIONS -       - MEDICATION INSTRUCTIONS -       - MEDICATION INSTRUCTIONS -         remdesivir  100 mg Intravenous Q24H    And     sodium chloride 0.9%  50 mL Intravenous Q24H     dexamethasone  6 mg Oral Daily     enoxaparin ANTICOAGULANT  40 mg Subcutaneous Q24H     furosemide  40 mg Intravenous Once     insulin aspart  1-10 Units Subcutaneous TID AC     insulin aspart  1-5  Units Subcutaneous At Bedtime     insulin aspart   Subcutaneous TID AC     insulin glargine  16 Units Subcutaneous At Bedtime     insulin glargine  62 Units Subcutaneous QAM AC     ipratropium-albuterol  2 puff Inhalation 4x Daily     pioglitazone  45 mg Oral Daily     rosuvastatin  20 mg Oral Daily     sodium chloride (PF)  3 mL Intracatheter Q8H        Data   Recent Labs   Lab 09/17/21  1301 09/17/21  1149 09/17/21  0935 09/17/21  0856 09/17/21  0701 09/17/21  0205 09/16/21  1255 09/16/21  0711 09/15/21  0845 09/15/21  0603 09/13/21  0919 09/13/21  0612 09/12/21  1211 09/12/21  0552 09/11/21  1213 09/11/21  0550   WBC  --   --   --   --   --   --   --   --   --   --   --   --   --   --   --  5.2   HGB  --   --   --   --   --   --   --   --   --   --   --   --   --   --   --  12.5   MCV  --   --   --   --   --   --   --   --   --   --   --   --   --   --   --  93   PLT  --   --   --   --   --   --   --  213  --   --   --  172  --   --   --  157   NA  --   --   --   --  136  --   --   --   --   --   --   --   --  132*  --  134   POTASSIUM 3.9  --   --   --  3.1*  --   --   --   --   --   --   --   --  3.6  --  4.2   CHLORIDE  --   --   --   --  105  --   --   --   --   --   --   --   --  104  --  105   CO2  --   --   --   --  27  --   --   --   --   --   --   --   --  21  --  16*   BUN  --   --   --   --  14  --   --   --   --   --   --   --   --  13  --  16   CR  --   --   --   --  0.47*  --   --  0.41*  --   --   --  0.45*  --  0.47*  --  0.48*   ANIONGAP  --   --   --   --  4  --   --   --   --   --   --   --   --  7  --  13   GINNA  --   --   --   --  8.4*  --   --   --   --   --   --   --   --  8.1*  --  8.2*   GLC  --  286* 166* 67* 91   < >   < > 98   < >  --    < >  --    < > 276*   < > 360*   ALBUMIN  --   --   --   --  2.3*  --   --   --   --   --   --   --   --   --   --   --    PROTTOTAL  --   --   --   --  6.7*  --   --   --   --   --   --   --   --   --   --   --    BILITOTAL  --   --   --   --  0.5  --    --   --   --   --   --   --   --   --   --   --    ALKPHOS  --   --   --   --  84  --   --   --   --   --   --   --   --   --   --   --    ALT  --   --   --   --  33  --   --   --   --  26   < > 39  --   --   --  60*   AST  --   --   --   --  41  --   --   --   --  20   < > 27  --   --   --  57*    < > = values in this interval not displayed.       No results found for this or any previous visit (from the past 24 hour(s)).

## 2021-09-17 NOTE — CONSULTS
PULMONOLOGY CONSULTATION  Date of service: 9/16/2021    [facility]    _____________________________________________________    Irma Anglin  65 year old female  [id number]  [address]  [place] [facility] [address]    Primary Care Provider:  [facility]  Admission Date: 9/10/2021  Hospital Attending Physician:  William Smith MD  ________________________________________    CHIEF COMPLAINT : I was asked to see this patient by Dr. Rivera for evaluation of acute hypoxic respiratory failure, covid-19, abnormal chest imaging    Informant: EHR and patient    HISTORY OF PRESENT ILLNESS     Irma is a 65F with pmhx notable for DM2 admitted with cough, hypoxic and scattered infiltrates, covid19 positive in the setting of 1 week of cough, denies being on bronchodilators or o2 at home. Denies hx of smoking. Feeling better this evening. Questions answered about o2 usage and goals for discharge. Video chat with use of interpretor graciously assisted by nursing team.       HOME MEDICATIONS     Medications Prior to Admission   Medication Sig Dispense Refill Last Dose     insulin aspart (NOVOLOG VIAL) 100 UNITS/ML vial Inject Subcutaneous 3 times daily (with meals) Inject 52 units with breakfast, 60 units with lunch, and 30 units with dinner        insulin glargine (LANTUS PEN) 100 UNIT/ML pen Inject 62 Units Subcutaneous 2 times daily         pioglitazone (ACTOS) 45 MG tablet Take 45 mg by mouth daily        rosuvastatin (CRESTOR) 20 MG tablet Take 20 mg by mouth daily          PAST MEDICAL HISTORY      Past Medical History:   Diagnosis Date     Diabetes (H)      Diabetes mellitus (H)      Gastro-oesophageal reflux disease      Past Surgical History:   Procedure Laterality Date     ABDOMEN SURGERY         ALLERGIES     Allergies   Allergen Reactions     Codeine      Other reaction(s): Dizziness  Not clear if ever had a rash     Exenatide Nausea and Vomiting      Nausea NO VOMITING     Metformin Nausea and Vomiting     Hydrocodone-Acetaminophen Rash       SOCIAL / SUBSTANCE HISTORY     Social History     Socioeconomic History     Marital status:      Spouse name: Not on file     Number of children: Not on file     Years of education: Not on file     Highest education level: Not on file   Occupational History     Not on file   Tobacco Use     Smoking status: Former Smoker   Substance and Sexual Activity     Alcohol use: No     Drug use: No     Sexual activity: Yes   Other Topics Concern     Not on file   Social History Narrative    ** Merged History Encounter **          Social Determinants of Health     Financial Resource Strain:      Difficulty of Paying Living Expenses:    Food Insecurity:      Worried About Running Out of Food in the Last Year:      Ran Out of Food in the Last Year:    Transportation Needs:      Lack of Transportation (Medical):      Lack of Transportation (Non-Medical):    Physical Activity:      Days of Exercise per Week:      Minutes of Exercise per Session:    Stress:      Feeling of Stress :    Social Connections:      Frequency of Communication with Friends and Family:      Frequency of Social Gatherings with Friends and Family:      Attends Denominational Services:      Active Member of Clubs or Organizations:      Attends Club or Organization Meetings:      Marital Status:    Intimate Partner Violence:      Fear of Current or Ex-Partner:      Emotionally Abused:      Physically Abused:      Sexually Abused:        FAMILY HISTORY   No family history on file.    REVIEW OF SYSTEMS   A comprehensive review of systems was negative except for items noted in HPI/Subjective.    PHYSICAL EXAMINATION   Temp (24hrs), Av.3  F (36.8  C), Min:98.2  F (36.8  C), Max:98.4  F (36.9  C)    Vital signs:  Temp: 98.3  F (36.8  C) Temp src: Oral BP: 122/61 Pulse: 74   Resp: 16 SpO2: 98 % O2 Device: High Flow Nasal Cannula (HFNC) Oxygen Delivery: 45 LPM Height:  152.4 cm (5') Weight: 89.5 kg (197 lb 5 oz)  Estimated body mass index is 38.53 kg/m  as calculated from the following:    Height as of this encounter: 1.524 m (5').    Weight as of this encounter: 89.5 kg (197 lb 5 oz).        I/O last 3 completed shifts:  In: 1200 [P.O.:1200]  Out: 400 [Urine:400]    *Due to the current COVID-19 pandemic, with need to conserve PPE and minimize non-essential exposure to patients diagnosed or under investigation, a limited examination was performed on this patient. Interview was done via ipad with interactive audio and video. Attending provider's physical exam findings were noted*    LABORATORY ASSESSMENT    Arterial Blood Gas  Recent Labs   Lab 09/14/21  0131   PH 7.45   PCO2 32*   PO2 70*   HCO3 22   O2PER 75     CBC  Recent Labs   Lab 09/16/21  0711 09/13/21  0612 09/11/21  0550 09/10/21  1156   WBC  --   --  5.2 5.8   RBC  --   --  3.99 4.21   HGB  --   --  12.5 13.1   HCT  --   --  37.1 38.5   MCV  --   --  93 91   MCH  --   --  31.3 31.1   MCHC  --   --  33.7 34.0   RDW  --   --  11.9 11.9    172 157 145*     BMP  Recent Labs   Lab 09/16/21  1723 09/16/21  1255 09/16/21  0711 09/16/21  0619 09/13/21  0919 09/13/21  0612 09/12/21  1211 09/12/21  0552 09/11/21  1213 09/11/21  0550 09/10/21  2128 09/10/21  1155   NA  --   --   --   --   --   --   --  132*  --  134  --  126*   POTASSIUM  --   --   --   --   --   --   --  3.6  --  4.2  --  4.2   CHLORIDE  --   --   --   --   --   --   --  104  --  105  --  98   GINNA  --   --   --   --   --   --   --  8.1*  --  8.2*  --  8.0*   CO2  --   --   --   --   --   --   --  21  --  16*  --  20   BUN  --   --   --   --   --   --   --  13  --  16  --  9   CR  --   --  0.41*  --   --  0.45*  --  0.47*  --  0.48*  --  0.43*   * 218* 98 106*   < >  --    < > 276*   < > 360*   < > 353*    < > = values in this interval not displayed.     INRNo lab results found in last 7 days.   BNP  Recent Labs   Lab 09/16/21  1520   NTBNP 105      VENOUS BLOOD GASESNo lab results found in last 7 days.      Additional labs and/or comments:     IMAGING      CT chest 9/10:  1.  No evidence of pulmonary embolism.     2. Commonly reported imaging features of (COVID-19) pneumonia are present. Influenza pneumonia, organizing pneumonia, drug toxicity and connective tissue disease can cause a similar imaging pattern.      cxr 9/15: Moderate to severe bilateral infiltrates.    PFT & OTHER TESTING       ASSESSMENT / PLAN      Pulmonary diagnoses:  Abnl CT/CXR R91.8  Hypoxemia R09.02    Additional COVID-19 diagnoses:  ARDS due to confirmed COVID-19 J80, U07.1      ASSESSMENT : 65F, hx of dm2, not vaccinated, with covid 19. On heated high flow o2 and maximal therapy. Limited additional options and expect slow recovery. Attempted to contact daughterPerlita, at [phone]. No answer, will try again tomorrow.      PLAN:     Agree with decadron, remdesivir, lovenox    Wean o2 as able, goal sat ~ 88%, anticipate need for o2 on discharge    Continue to diurese as able    Low threshold to add abx for possible concurrent pna if decompensates    Prn bronchodilators    Prone as able / tolerates    Will need outpt pulm follow-up and repeat CT chest in 8-10 weeks    Continue supportive care, little additional options    Start anoro once daily for possible copd    Would encourage vaccine after discharge    Will follow      Ulises Gonzalez  [facility]  Office: [phone]  Pager: [phone]

## 2021-09-17 NOTE — PROGRESS NOTES
"BRIEF NUTRITION ASSESSMENT      REASON FOR ASSESSMENT:  Irma Anglin is a 65 year old female seen by Registered Dietitian for [facility]    NUTRITION HISTORY:  - H/o diabetes and HLD. Presented with cough, loss of taste and smell r/t COVID.     CURRENT DIET AND INTAKE:  Diet: 60 g CHO       Tolerating % of meals since admission. Ordering meals TID most days.   Appetite good.     ANTHROPOMETRICS:  Height: 5' 0\"  Weight:  197 lbs 4.99 oz (89.5 kg)   Body mass index is 38.53 kg/m .   Weight Status: Obesity Grade II BMI 35-39.9  IBW:  45.5 kg   %IBW: 197%  Weight History: No wt loss indicated   Wt Readings from Last 10 Encounters:   09/12/21 89.5 kg (197 lb 5 oz)   04/24/17 86.2 kg (190 lb)       LABS:  Reviewed    CRP 22.3 (H) - trending down. indicative of inflammation     MALNUTRITION:  Visual Nutrition Focused Physical Assessment (NFPA) not completed due to restrictions on face-to-face patient care during COVID-19 Pandemic. Do not suspect muscle/fat losses.  Patient does not meet two of the following criteria necessary for diagnosing malnutrition.     % Weight Loss:  None noted  % Intake:  No decreased intake noted  Subcutaneous Fat Loss:  deferred  Muscle Loss:  deferred  Fluid Retention:  None noted    NUTRITION INTERVENTION:  Nutrition Diagnosis:  No nutrition diagnosis at this time.    Implementation:  Nutrition Education: Per Provider order if indicated    FOLLOW UP/MONITORING:   Will re-evaluate in 7 - 10 days, or sooner, if re-consulted.    Radha Arizmendi RD,   [facility] [place], [address], [facility]   Pager: [phone]  Weekend Pager: [phone]    "

## 2021-09-17 NOTE — PLAN OF CARE
Patient is A&O x4, [place] Korean speaking, able to request her needs, michaeler utilized as well. Denies chest pain. Dyspnea on exertion. On high flow NC. Up with SBA to BSC. Voiding adequately. Had BM this shift. Encouraged IS. Remdesivir infusing.  Contact precaution maintained. Will continue to monitor.

## 2021-09-17 NOTE — PLAN OF CARE
Pt A&O X4, [place] speaking, able to make needs known in English, Carmine also used. VSS, on 45 L high flow. Up indep in room to BSC. Denies SOB. Hot compress to L labia cyst, no drainage. IV SL. Continue to monitor.

## 2021-09-17 NOTE — PROGRESS NOTES
PULMONOLOGY PROGRESS NOTE    Date of Admission: 9/10/2021    CC/Reason for Hospital visit: acute hypoxic respiratory failure, covid-19, abnormal chest imaging    SUBJECTIVE      Breathing mildly improved today. Remains on high flow o2. Sitting up in bed. Pulm spoke with pt via telephone. Motivated to try proning prn.      ROS: A Problem Pertinent review of systems was negative except for items noted in HPI.  Past Medical, Family, and Social/Substance History has been reviewed: No interval changes.   OBJECTIVE   Vital signs:  Temp: 98.3  F (36.8  C) Temp src: Oral BP: 104/53 Pulse: 75   Resp: 18 SpO2: 94 % O2 Device: High Flow Nasal Cannula (HFNC) Oxygen Delivery: 45 LPM Height: 152.4 cm (5') Weight: 89.5 kg (197 lb 5 oz)  Estimated body mass index is 38.53 kg/m  as calculated from the following:    Height as of this encounter: 1.524 m (5').    Weight as of this encounter: 89.5 kg (197 lb 5 oz).        I/O last 3 completed shifts:  In: 1100 [P.O.:1100]  Out: 2300 [Urine:2300]      *Due to the current COVID-19 pandemic, with need to conserve PPE and minimize non-essential exposure to patients diagnosed or under investigation, a limited examination was performed on this patient. Interview was done via patient's hospital room telephone, and patient was visualized through hospital door window. Attending provider's physical exam findings were noted*      LABORATORY ASSESSMENT    Arterial Blood Gas  Recent Labs   Lab 09/14/21  0131   PH 7.45   PCO2 32*   PO2 70*   HCO3 22   O2PER 75     CBC  Recent Labs   Lab 09/16/21  0711 09/13/21  0612 09/11/21  0550   WBC  --   --  5.2   RBC  --   --  3.99   HGB  --   --  12.5   HCT  --   --  37.1   MCV  --   --  93   MCH  --   --  31.3   MCHC  --   --  33.7   RDW  --   --  11.9    172 157     BMP  Recent Labs   Lab 09/17/21  1301 09/17/21  1149 09/17/21  0935 09/17/21  0856 09/17/21  0701 09/16/21  1255 09/16/21  0711 09/13/21  0919 09/13/21  0612 09/12/21  1211 09/12/21  0552  09/11/21  1213 09/11/21  0550   NA  --   --   --   --  136  --   --   --   --   --  132*  --  134   POTASSIUM 3.9  --   --   --  3.1*  --   --   --   --   --  3.6  --  4.2   CHLORIDE  --   --   --   --  105  --   --   --   --   --  104  --  105   GINNA  --   --   --   --  8.4*  --   --   --   --   --  8.1*  --  8.2*   CO2  --   --   --   --  27  --   --   --   --   --  21  --  16*   BUN  --   --   --   --  14  --   --   --   --   --  13  --  16   CR  --   --   --   --  0.47*  --  0.41*  --  0.45*  --  0.47*  --  0.48*   GLC  --  286* 166* 67* 91   < > 98   < >  --    < > 276*   < > 360*    < > = values in this interval not displayed.     INRNo lab results found in last 7 days.   BNP  Recent Labs   Lab 09/16/21  1520   NTBNP 105     VENOUS BLOOD GASESNo lab results found in last 7 days.      Additional labs and/or comments:     IMAGING      CT chest 9/10:  1.  No evidence of pulmonary embolism.     2. Commonly reported imaging features of (COVID-19) pneumonia are present. Influenza pneumonia, organizing pneumonia, drug toxicity and connective tissue disease can cause a similar imaging pattern.        cxr 9/15: Moderate to severe bilateral infiltrates.    PFT & OTHER TESTING       ASSESSMENT / PLAN      Pulmonary diagnoses:  Abnl CT/CXR R91.8  Hypoxemia R09.02     Additional COVID-19 diagnoses:  ARDS due to confirmed COVID-19 J80, U07.1        ASSESSMENT : 65F, hx of dm2, not vaccinated, with covid 19. On heated high flow o2 and maximal therapy. Limited additional options and expect slow recovery. Pulm team (Dr Sommer) spoke with pt today to explain role of o2 and tapering, goal sats, and role of proning. Pt agrees.        PLAN:     Agree with decadron, remdesivir, lovenox    Wean o2 as able, goal sat ~ 88%, anticipate need for o2 on discharge    Continue to diurese as able    Low threshold to add abx for possible concurrent pna if decompensates    Prn bronchodilators    Prone as able / tolerates    Will need outpt  pulm follow-up and repeat CT chest in 8-10 weeks    Continue supportive care, little additional options    Continue anoro once daily    Would encourage vaccine after discharge    No further suggestions. Anticipate slow recovery. Will not follow but available if condition changes. Please call if needed       Ulises Gonzalez  [facility]  Office: [phone]  Pager: [phone]

## 2021-09-18 LAB
ANION GAP SERPL CALCULATED.3IONS-SCNC: 6 MMOL/L (ref 3–14)
BUN SERPL-MCNC: 20 MG/DL (ref 7–30)
CALCIUM SERPL-MCNC: 8.8 MG/DL (ref 8.5–10.1)
CHLORIDE BLD-SCNC: 106 MMOL/L (ref 94–109)
CO2 SERPL-SCNC: 28 MMOL/L (ref 20–32)
CREAT SERPL-MCNC: 0.53 MG/DL (ref 0.52–1.04)
GFR SERPL CREATININE-BSD FRML MDRD: >90 ML/MIN/1.73M2
GLUCOSE BLD-MCNC: 82 MG/DL (ref 70–99)
GLUCOSE BLDC GLUCOMTR-MCNC: 110 MG/DL (ref 70–99)
GLUCOSE BLDC GLUCOMTR-MCNC: 124 MG/DL (ref 70–99)
GLUCOSE BLDC GLUCOMTR-MCNC: 167 MG/DL (ref 70–99)
GLUCOSE BLDC GLUCOMTR-MCNC: 204 MG/DL (ref 70–99)
GLUCOSE BLDC GLUCOMTR-MCNC: 313 MG/DL (ref 70–99)
GLUCOSE BLDC GLUCOMTR-MCNC: 403 MG/DL (ref 70–99)
GLUCOSE BLDC GLUCOMTR-MCNC: 448 MG/DL (ref 70–99)
POTASSIUM BLD-SCNC: 3.5 MMOL/L (ref 3.4–5.3)
SODIUM SERPL-SCNC: 140 MMOL/L (ref 133–144)

## 2021-09-18 PROCEDURE — 250N000009 HC RX 250: Performed by: HOSPITALIST

## 2021-09-18 PROCEDURE — 250N000012 HC RX MED GY IP 250 OP 636 PS 637: Performed by: INTERNAL MEDICINE

## 2021-09-18 PROCEDURE — 99207 PR MOONLIGHTING INDICATOR: CPT | Performed by: INTERNAL MEDICINE

## 2021-09-18 PROCEDURE — 36415 COLL VENOUS BLD VENIPUNCTURE: CPT | Performed by: STUDENT IN AN ORGANIZED HEALTH CARE EDUCATION/TRAINING PROGRAM

## 2021-09-18 PROCEDURE — 250N000013 HC RX MED GY IP 250 OP 250 PS 637: Performed by: INTERNAL MEDICINE

## 2021-09-18 PROCEDURE — 250N000012 HC RX MED GY IP 250 OP 636 PS 637: Performed by: STUDENT IN AN ORGANIZED HEALTH CARE EDUCATION/TRAINING PROGRAM

## 2021-09-18 PROCEDURE — 258N000003 HC RX IP 258 OP 636: Performed by: HOSPITALIST

## 2021-09-18 PROCEDURE — 80048 BASIC METABOLIC PNL TOTAL CA: CPT | Performed by: STUDENT IN AN ORGANIZED HEALTH CARE EDUCATION/TRAINING PROGRAM

## 2021-09-18 PROCEDURE — 120N000001 HC R&B MED SURG/OB

## 2021-09-18 PROCEDURE — 250N000011 HC RX IP 250 OP 636: Performed by: HOSPITALIST

## 2021-09-18 PROCEDURE — 250N000011 HC RX IP 250 OP 636: Performed by: INTERNAL MEDICINE

## 2021-09-18 PROCEDURE — 99232 SBSQ HOSP IP/OBS MODERATE 35: CPT | Performed by: INTERNAL MEDICINE

## 2021-09-18 PROCEDURE — 250N000013 HC RX MED GY IP 250 OP 250 PS 637: Performed by: HOSPITALIST

## 2021-09-18 PROCEDURE — 999N000157 HC STATISTIC RCP TIME EA 10 MIN

## 2021-09-18 RX ORDER — FUROSEMIDE 10 MG/ML
40 INJECTION INTRAMUSCULAR; INTRAVENOUS ONCE
Status: COMPLETED | OUTPATIENT
Start: 2021-09-18 | End: 2021-09-18

## 2021-09-18 RX ADMIN — IPRATROPIUM BROMIDE AND ALBUTEROL 2 PUFF: 20; 100 SPRAY, METERED RESPIRATORY (INHALATION) at 23:08

## 2021-09-18 RX ADMIN — REMDESIVIR 100 MG: 100 INJECTION, POWDER, LYOPHILIZED, FOR SOLUTION INTRAVENOUS at 23:04

## 2021-09-18 RX ADMIN — ACETAMINOPHEN 650 MG: 325 TABLET, FILM COATED ORAL at 20:58

## 2021-09-18 RX ADMIN — Medication 1 DROP: at 08:37

## 2021-09-18 RX ADMIN — INSULIN ASPART 5 UNITS: 100 INJECTION, SOLUTION INTRAVENOUS; SUBCUTANEOUS at 17:01

## 2021-09-18 RX ADMIN — INSULIN ASPART 5 UNITS: 100 INJECTION, SOLUTION INTRAVENOUS; SUBCUTANEOUS at 13:18

## 2021-09-18 RX ADMIN — ENOXAPARIN SODIUM 40 MG: 40 INJECTION SUBCUTANEOUS at 21:04

## 2021-09-18 RX ADMIN — DEXAMETHASONE 6 MG: 2 TABLET ORAL at 08:37

## 2021-09-18 RX ADMIN — Medication 1 MG: at 20:58

## 2021-09-18 RX ADMIN — FUROSEMIDE 40 MG: 10 INJECTION, SOLUTION INTRAVENOUS at 17:01

## 2021-09-18 RX ADMIN — ROSUVASTATIN CALCIUM 20 MG: 20 TABLET, FILM COATED ORAL at 08:37

## 2021-09-18 RX ADMIN — INSULIN GLARGINE 62 UNITS: 100 INJECTION, SOLUTION SUBCUTANEOUS at 08:43

## 2021-09-18 RX ADMIN — INSULIN ASPART 5 UNITS: 100 INJECTION, SOLUTION INTRAVENOUS; SUBCUTANEOUS at 08:38

## 2021-09-18 RX ADMIN — IPRATROPIUM BROMIDE AND ALBUTEROL 2 PUFF: 20; 100 SPRAY, METERED RESPIRATORY (INHALATION) at 12:48

## 2021-09-18 RX ADMIN — SODIUM CHLORIDE 50 ML: 9 INJECTION, SOLUTION INTRAVENOUS at 00:45

## 2021-09-18 RX ADMIN — IPRATROPIUM BROMIDE AND ALBUTEROL 2 PUFF: 20; 100 SPRAY, METERED RESPIRATORY (INHALATION) at 17:05

## 2021-09-18 RX ADMIN — IPRATROPIUM BROMIDE AND ALBUTEROL 2 PUFF: 20; 100 SPRAY, METERED RESPIRATORY (INHALATION) at 08:38

## 2021-09-18 RX ADMIN — PIOGLITAZONE 45 MG: 45 TABLET ORAL at 08:37

## 2021-09-18 RX ADMIN — BENZONATATE 100 MG: 100 CAPSULE ORAL at 20:58

## 2021-09-18 RX ADMIN — INSULIN GLARGINE 12 UNITS: 100 INJECTION, SOLUTION SUBCUTANEOUS at 20:58

## 2021-09-18 ASSESSMENT — ACTIVITIES OF DAILY LIVING (ADL)
ADLS_ACUITY_SCORE: 9
ADLS_ACUITY_SCORE: 11
ADLS_ACUITY_SCORE: 11
ADLS_ACUITY_SCORE: 9
ADLS_ACUITY_SCORE: 11
ADLS_ACUITY_SCORE: 9

## 2021-09-18 NOTE — PROGRESS NOTES
[facility]  Hospitalist Progress Note for 9/18/2021       Assessment and Plan:   Irma Anglin is a 65 year old female with PMH diabetes and hyperlipidemia who presents with cough/suspected Covid     Cough, dyspnea  Covid 19 pneumonia  Acute hypoxic respiratory failure  1 week history of cough, nonproductive, afebrile. In ED admission O2 sat 85% on room air, requiring 3 L NC. Chest CT, patchy bilateral GGO; no consolidation. Sodium 126, BUN/CR 9/0.3, glucose 353, ALT/AST 76/82. , LA 1.4. Troponins negative. BNP 32. D-dimer 1.0, chest CT negative for PE. In ED, received dexamethasone IV and remdesivir. At this time no indication of secondary bacterial pneumonia, WBC WNL, afebrile.   cont-  -Decadron 6 mg daily day 7/10 days  -IV remdesivir started on 9/10,extended on 8/16 for 5 more days & to end on 9/20  -Lovenox 40 mg subcutaneous daily per covid protocol for DVT prophylaxis.   -Oximetry; Supplemental oxygen; wean as able, patient is on 35-45L-> 12 lit this evening  - albuterol inhalers scheduled and PRN  - pulmonary evaluated and added on anoro  - given prn 40mg of lasix on 9/16 &9/17 with > 4.9.L of output, will give another dose today 9/18th and follow response/renal functions  - pulmonary consult  & recommendations appreciated(outpt pulm follow-up and repeat CT chest in 8-10 weeks, continue anoro once daily & encourage vaccine after discharge  - start IS    Diabetes type 2, poorly controlled  PTA not verified at this time; discrepancy between Endocrinology Note 7/26/2021  unverified PTA medications of Lantus 52 units every morning, 62 units every afternoon, NovoLog 3 times daily at 52 units, 60 units and 35 units with each meal and Actos 45 mg daily  -Last A1c 5/2021 at 10.2 which indicates poor control, repeat hemoglobin A1c is 9.7  - labile blood sugars this admission, currently on 62 units in the AM and night dose was decreased on 9/17 from16 units-> 12 nightly( as BG is  typically low in the AM and then creeps up with meals> will decrease nightly insulin slightly) and  5 units TID scheduled with meal time additionally with sliding scale.   - 9/18 BS better 124-> 110-> 167. Cont same Lantus 62 U in AM, 12 U at HS & Novolog 5 U TID +ISS     Hyponatremia- resolved  Resolved following hydration.     L labial cyst  -likely bartholin's cyst selling however official gyn consult is pending  - recommend warm compresses for now     Language Barrier, speaks Uzbek     DVT Prophylaxis: lovenox      Code Status: Special - DNI but ok with CPR       Disposition  - 1-2 days, when able to wean oxygen down to ~ 4 lit to be able to discharge on home O2.     Mary Copeland MD.  Hospitalist [phone] (7am -6 pm)             Interval History:   Feeling better              Medications:       remdesivir  100 mg Intravenous Q24H    And     sodium chloride 0.9%  50 mL Intravenous Q24H     dexamethasone  6 mg Oral Daily     enoxaparin ANTICOAGULANT  40 mg Subcutaneous Q24H     insulin aspart  5 Units Subcutaneous TID w/meals     insulin aspart  1-10 Units Subcutaneous TID AC     insulin aspart  1-5 Units Subcutaneous At Bedtime     insulin aspart   Subcutaneous TID AC     insulin glargine  12 Units Subcutaneous At Bedtime     insulin glargine  62 Units Subcutaneous QAM AC     ipratropium-albuterol  2 puff Inhalation 4x Daily     pioglitazone  45 mg Oral Daily     rosuvastatin  20 mg Oral Daily     sodium chloride (PF)  3 mL Intracatheter Q8H     acetaminophen **OR** [DISCONTINUED] acetaminophen, benzonatate, artificial tears ophthalmic solution, glucose **OR** dextrose **OR** glucagon, lidocaine 4%, lidocaine (buffered or not buffered), - MEDICATION INSTRUCTIONS -, melatonin, - MEDICATION INSTRUCTIONS -, - MEDICATION INSTRUCTIONS -, sodium chloride (PF)               Physical Exam:   Blood pressure 115/50, pulse 64, temperature 98.1  F (36.7  C), temperature source Oral, resp. rate 18, height 1.524 m  (5'), weight 89.5 kg (197 lb 5 oz), SpO2 95 %.  Wt Readings from Last 4 Encounters:   21 89.5 kg (197 lb 5 oz)   17 86.2 kg (190 lb)         Vital Sign Ranges  Temperature Temp  Av.2  F (36.8  C)  Min: 98.1  F (36.7  C)  Max: 98.4  F (36.9  C)   Blood pressure Systolic (24hrs), Av , Min:107 , Max:143        Diastolic (24hrs), Av, Min:50, Max:74      Pulse Pulse  Av.7  Min: 56  Max: 64   Respirations Resp  Av  Min: 18  Max: 18   Pulse oximetry SpO2  Av.3 %  Min: 93 %  Max: 98 %         Intake/Output Summary (Last 24 hours) at 2021 1537  Last data filed at 2021  Gross per 24 hour   Intake --   Output 1000 ml   Net -1000 ml       Constitutional: Awake, alert, cooperative, no apparent distress with O2 via high flow 45%. Pt sitting up at edge of bed cutting flowers & rearranging the flower vases.   Lungs: Diminished to auscultation bilaterally   Cardiovascular: Regular rate and rhythm,S1 and S2   Abdomen: Normal bowel sounds, soft, non-distended, non-tender          Data:   All laboratory data reviewed

## 2021-09-18 NOTE — PLAN OF CARE
Pt is alert and oriented x4, denies pain. Lung sounds diminished, having dyspnea on exertion. On 45L and 60% FiO2 via high flow nasal cannula, O2 sats above 94%. Up in room to commode independently w/o difficulty. Voiding without difficulty. Pt IV not flushing/pain with flushing appeared infiltrated, remdesivir delayed due to problems with IV access, resource nurse able to reposition/fix IV, no new IV needed. Remdesivir given as ordered (see MAR). Continue to assess resp status. Continue POC.

## 2021-09-18 NOTE — PLAN OF CARE
Pt A&O X4, Azeri speaking, able to understand/communicate using English. Jabber used at times. VSS on 12L oximizer. Maintaining 95%. Lung sounds diminished. Up indep to BSC, stable. Voiding adequately. R groin cyst draining some, warm compress given. BG better today. Progressing toward plan of care.

## 2021-09-19 ENCOUNTER — APPOINTMENT (OUTPATIENT)
Dept: GENERAL RADIOLOGY | Facility: CLINIC | Age: 66
End: 2021-09-19
Attending: NURSE PRACTITIONER
Payer: MEDICAID

## 2021-09-19 LAB
ANION GAP SERPL CALCULATED.3IONS-SCNC: 4 MMOL/L (ref 3–14)
BUN SERPL-MCNC: 19 MG/DL (ref 7–30)
CALCIUM SERPL-MCNC: 8.6 MG/DL (ref 8.5–10.1)
CHLORIDE BLD-SCNC: 105 MMOL/L (ref 94–109)
CO2 SERPL-SCNC: 27 MMOL/L (ref 20–32)
CREAT SERPL-MCNC: 0.52 MG/DL (ref 0.52–1.04)
D DIMER PPP FEU-MCNC: 0.5 UG/ML FEU (ref 0–0.5)
GFR SERPL CREATININE-BSD FRML MDRD: >90 ML/MIN/1.73M2
GLUCOSE BLD-MCNC: 58 MG/DL (ref 70–99)
GLUCOSE BLDC GLUCOMTR-MCNC: 157 MG/DL (ref 70–99)
GLUCOSE BLDC GLUCOMTR-MCNC: 176 MG/DL (ref 70–99)
GLUCOSE BLDC GLUCOMTR-MCNC: 234 MG/DL (ref 70–99)
GLUCOSE BLDC GLUCOMTR-MCNC: 251 MG/DL (ref 70–99)
GLUCOSE BLDC GLUCOMTR-MCNC: 253 MG/DL (ref 70–99)
GLUCOSE BLDC GLUCOMTR-MCNC: 43 MG/DL (ref 70–99)
NT-PROBNP SERPL-MCNC: 40 PG/ML (ref 0–900)
PLATELET # BLD AUTO: 262 10E3/UL (ref 150–450)
POTASSIUM BLD-SCNC: 3.6 MMOL/L (ref 3.4–5.3)
SODIUM SERPL-SCNC: 136 MMOL/L (ref 133–144)
TROPONIN I SERPL-MCNC: <0.015 UG/L (ref 0–0.04)

## 2021-09-19 PROCEDURE — 80048 BASIC METABOLIC PNL TOTAL CA: CPT | Performed by: INTERNAL MEDICINE

## 2021-09-19 PROCEDURE — 250N000012 HC RX MED GY IP 250 OP 636 PS 637: Performed by: STUDENT IN AN ORGANIZED HEALTH CARE EDUCATION/TRAINING PROGRAM

## 2021-09-19 PROCEDURE — 85049 AUTOMATED PLATELET COUNT: CPT | Performed by: HOSPITALIST

## 2021-09-19 PROCEDURE — 36415 COLL VENOUS BLD VENIPUNCTURE: CPT | Performed by: NURSE PRACTITIONER

## 2021-09-19 PROCEDURE — 84484 ASSAY OF TROPONIN QUANT: CPT | Performed by: NURSE PRACTITIONER

## 2021-09-19 PROCEDURE — 250N000013 HC RX MED GY IP 250 OP 250 PS 637

## 2021-09-19 PROCEDURE — 85379 FIBRIN DEGRADATION QUANT: CPT | Performed by: NURSE PRACTITIONER

## 2021-09-19 PROCEDURE — 71045 X-RAY EXAM CHEST 1 VIEW: CPT

## 2021-09-19 PROCEDURE — 250N000011 HC RX IP 250 OP 636: Performed by: HOSPITALIST

## 2021-09-19 PROCEDURE — 36415 COLL VENOUS BLD VENIPUNCTURE: CPT | Performed by: INTERNAL MEDICINE

## 2021-09-19 PROCEDURE — 99291 CRITICAL CARE FIRST HOUR: CPT | Performed by: NURSE PRACTITIONER

## 2021-09-19 PROCEDURE — 258N000003 HC RX IP 258 OP 636: Performed by: HOSPITALIST

## 2021-09-19 PROCEDURE — 83880 ASSAY OF NATRIURETIC PEPTIDE: CPT | Performed by: NURSE PRACTITIONER

## 2021-09-19 PROCEDURE — 250N000009 HC RX 250: Performed by: HOSPITALIST

## 2021-09-19 PROCEDURE — 250N000013 HC RX MED GY IP 250 OP 250 PS 637: Performed by: INTERNAL MEDICINE

## 2021-09-19 PROCEDURE — 120N000001 HC R&B MED SURG/OB

## 2021-09-19 PROCEDURE — 250N000012 HC RX MED GY IP 250 OP 636 PS 637: Performed by: INTERNAL MEDICINE

## 2021-09-19 PROCEDURE — 99232 SBSQ HOSP IP/OBS MODERATE 35: CPT | Performed by: INTERNAL MEDICINE

## 2021-09-19 PROCEDURE — 93005 ELECTROCARDIOGRAM TRACING: CPT

## 2021-09-19 PROCEDURE — 99207 PR MOONLIGHTING INDICATOR: CPT | Performed by: INTERNAL MEDICINE

## 2021-09-19 RX ORDER — NITROGLYCERIN 0.4 MG/1
0.4 TABLET SUBLINGUAL EVERY 5 MIN PRN
Status: DISCONTINUED | OUTPATIENT
Start: 2021-09-19 | End: 2021-09-22 | Stop reason: HOSPADM

## 2021-09-19 RX ORDER — POLYETHYLENE GLYCOL 3350 17 G/17G
17 POWDER, FOR SOLUTION ORAL DAILY
Status: DISCONTINUED | OUTPATIENT
Start: 2021-09-19 | End: 2021-09-22 | Stop reason: HOSPADM

## 2021-09-19 RX ORDER — NITROGLYCERIN 0.4 MG/1
TABLET SUBLINGUAL
Status: COMPLETED
Start: 2021-09-19 | End: 2021-09-19

## 2021-09-19 RX ADMIN — IPRATROPIUM BROMIDE AND ALBUTEROL 2 PUFF: 20; 100 SPRAY, METERED RESPIRATORY (INHALATION) at 21:18

## 2021-09-19 RX ADMIN — NITROGLYCERIN 0.4 MG: 0.4 TABLET SUBLINGUAL at 21:50

## 2021-09-19 RX ADMIN — SODIUM CHLORIDE 50 ML: 9 INJECTION, SOLUTION INTRAVENOUS at 00:13

## 2021-09-19 RX ADMIN — POLYETHYLENE GLYCOL 3350 17 G: 17 POWDER, FOR SOLUTION ORAL at 18:10

## 2021-09-19 RX ADMIN — INSULIN GLARGINE 12 UNITS: 100 INJECTION, SOLUTION SUBCUTANEOUS at 22:09

## 2021-09-19 RX ADMIN — PIOGLITAZONE 45 MG: 45 TABLET ORAL at 10:01

## 2021-09-19 RX ADMIN — IPRATROPIUM BROMIDE AND ALBUTEROL 2 PUFF: 20; 100 SPRAY, METERED RESPIRATORY (INHALATION) at 10:01

## 2021-09-19 RX ADMIN — IPRATROPIUM BROMIDE AND ALBUTEROL 2 PUFF: 20; 100 SPRAY, METERED RESPIRATORY (INHALATION) at 18:00

## 2021-09-19 RX ADMIN — ROSUVASTATIN CALCIUM 20 MG: 20 TABLET, FILM COATED ORAL at 10:01

## 2021-09-19 RX ADMIN — IPRATROPIUM BROMIDE AND ALBUTEROL 2 PUFF: 20; 100 SPRAY, METERED RESPIRATORY (INHALATION) at 12:45

## 2021-09-19 RX ADMIN — INSULIN GLARGINE 62 UNITS: 100 INJECTION, SOLUTION SUBCUTANEOUS at 10:01

## 2021-09-19 RX ADMIN — DEXAMETHASONE 6 MG: 2 TABLET ORAL at 10:01

## 2021-09-19 RX ADMIN — REMDESIVIR 100 MG: 100 INJECTION, POWDER, LYOPHILIZED, FOR SOLUTION INTRAVENOUS at 23:08

## 2021-09-19 RX ADMIN — INSULIN ASPART 5 UNITS: 100 INJECTION, SOLUTION INTRAVENOUS; SUBCUTANEOUS at 16:55

## 2021-09-19 RX ADMIN — ENOXAPARIN SODIUM 40 MG: 40 INJECTION SUBCUTANEOUS at 22:09

## 2021-09-19 ASSESSMENT — ACTIVITIES OF DAILY LIVING (ADL)
ADLS_ACUITY_SCORE: 9

## 2021-09-19 NOTE — PROGRESS NOTES
[facility]  Hospitalist Progress Note for 9/19/2021       Assessment and Plan:   Irma Anglin is a 65 year old female with PMH diabetes and hyperlipidemia who presents with cough/suspected Covid     Cough, dyspnea  Covid 19 pneumonia  Acute hypoxic respiratory failure  1 week history of cough, nonproductive, afebrile. In ED admission O2 sat 85% on room air, requiring 3 L NC. Chest CT, patchy bilateral GGO; no consolidation. Sodium 126, BUN/CR 9/0.3, glucose 353, ALT/AST 76/82. , LA 1.4. Troponins negative. BNP 32. D-dimer 1.0, chest CT negative for PE. In ED, received dexamethasone IV and remdesivir. At this time no indication of secondary bacterial pneumonia, WBC WNL, afebrile.   cont-  -Decadron 6 mg daily day 7/10 days  -IV remdesivir started on 9/10,extended on 8/16 for 5 more days & to end on 9/20  -Lovenox 40 mg subcutaneous daily per covid protocol for DVT prophylaxis.   -Oximetry;   - albuterol inhalers scheduled and PRN  - pulmonary evaluated and added on anoro  - received prn 40mg of lasix on 9/16 &9/17 with > 4.9.L of output, will give another dose today 9/18th and follow response/renal functions  - Supplemental oxygen; wean as able, patient is on 35-45L-> 12 -> 2 lit today. desaturats to 86%.  - possibly will need O2 at d/c  - pulmonary consult  & recommendations appreciated signed off (outpt pulm follow-up and repeat CT chest in 8-10 weeks, continue anoro once daily & encourage vaccine after discharge  - continue IS    Diabetes type 2, poorly controlled  PTA not verified at this time; discrepancy between Endocrinology Note 7/26/2021  unverified PTA medications of Lantus 52 units every morning, 62 units every afternoon, NovoLog 3 times daily at 52 units, 60 units and 35 units with each meal and Actos 45 mg daily  -Last A1c 5/2021 at 10.2 which indicates poor control, repeat hemoglobin A1c is 9.7  - labile blood sugars this admission, currently on 62 units in the AM  and night dose was decreased on 9/17 from16 units-> 12 nightly( as BG is typically low in the AM and then creeps up with meals> will decrease nightly insulin slightly) and  5 units TID scheduled with meal time additionally with sliding scale.   - 9/18 BS better 124-> 110-> 167. Cont same Lantus 62 U in AM, 12 U at HS & Novolog 5 U TID +ISS  - BS last night in 300-400's received extra 8 U Novolog & this AM BS in 40's. Cont same Lantus 62 U in AM, 12 U at HS & Novolog 5 U TID +ISS for today but request a small dinner & add a bed time snack.    Hyponatremia- resolved  Resolved following hydration.     L labial cyst  -likely bartholin's cyst selling however official gyn consult is pending  - recommend warm compresses for now     Language Barrier, speaks [place]ese     DVT Prophylaxis: lovenox      Code Status: Special - DNI but ok with CPR       Disposition  - 1-2 days, when able to wean oxygen down to ~ 4 lit to be able to discharge on home O2.  Spoke to pt's daughter- recommended outpt f/u with PCP, advised to encourage pt & pt's  to get covid vaccinations.      Mary Copeland MD.  Hospitalist [phone] (7am -6 pm)             Interval History:   Feeling better              Medications:       remdesivir  100 mg Intravenous Q24H    And     sodium chloride 0.9%  50 mL Intravenous Q24H     enoxaparin ANTICOAGULANT  40 mg Subcutaneous Q24H     insulin aspart  5 Units Subcutaneous TID w/meals     insulin aspart  1-10 Units Subcutaneous TID AC     insulin aspart  1-5 Units Subcutaneous At Bedtime     insulin aspart   Subcutaneous TID AC     insulin glargine  12 Units Subcutaneous At Bedtime     insulin glargine  62 Units Subcutaneous QAM AC     ipratropium-albuterol  2 puff Inhalation 4x Daily     pioglitazone  45 mg Oral Daily     rosuvastatin  20 mg Oral Daily     sodium chloride (PF)  3 mL Intracatheter Q8H     acetaminophen **OR** [DISCONTINUED] acetaminophen, benzonatate, artificial tears ophthalmic solution,  glucose **OR** dextrose **OR** glucagon, lidocaine 4%, lidocaine (buffered or not buffered), - MEDICATION INSTRUCTIONS -, melatonin, - MEDICATION INSTRUCTIONS -, - MEDICATION INSTRUCTIONS -, sodium chloride (PF)               Physical Exam:   Blood pressure 97/55, pulse 75, temperature 98.2  F (36.8  C), temperature source Oral, resp. rate 18, height 1.524 m (5'), weight 89.5 kg (197 lb 5 oz), SpO2 94 %.  Wt Readings from Last 4 Encounters:   21 89.5 kg (197 lb 5 oz)   17 86.2 kg (190 lb)         Vital Sign Ranges  Temperature Temp  Av.2  F (36.8  C)  Min: 98.1  F (36.7  C)  Max: 98.4  F (36.9  C)   Blood pressure Systolic (24hrs), Av , Min:107 , Max:143        Diastolic (24hrs), Av, Min:50, Max:74      Pulse Pulse  Av.7  Min: 56  Max: 64   Respirations Resp  Av  Min: 18  Max: 18   Pulse oximetry SpO2  Av.3 %  Min: 93 %  Max: 98 %         Intake/Output Summary (Last 24 hours) at 2021 1537  Last data filed at 2021  Gross per 24 hour   Intake --   Output 1000 ml   Net -1000 ml       Constitutional: Awake, alert, cooperative, no apparent distress with O2 via high flow 45%. Pt sitting up at edge of bed cutting flowers & rearranging the flower vases.   Lungs: Diminished to auscultation bilaterally   Cardiovascular: Regular rate and rhythm,S1 and S2   Abdomen: Normal bowel sounds, soft, non-distended, non-tender          Data:   All laboratory data reviewed

## 2021-09-19 NOTE — PROGRESS NOTES
Patient has been assessed for Home Oxygen needs. Oxygen readings:    *Pulse oximetry (SpO2) = 90% on room air at rest while awake.    *SpO2 improved to 94% on 2liters/minute at rest.    *SpO2 = 84% on room air during activity/with exercise.    *SpO2 improved to 90% on 2liters/minute during activity/with exercise.

## 2021-09-19 NOTE — PROVIDER NOTIFICATION
Notified Hospitalist at 8:40 with blood sugar of 43 and pt. Eating her breakfast. Ordered to check blood sugar 1 hour after breakfast and to hold insuline until then.

## 2021-09-19 NOTE — PLAN OF CARE
Improving. Oximyzer. 6 VSS. Good intake. Family tends to order more carb heavy diet. BG coverage given. Up independently to commode. Minimal cough. Feeling better. Possible bartholinian cyst/abscess, firm, tender, starting to bleed. Cleaned and applied dry gauze. GYN hopefully to consult tomorrow. Possible discharge home tomorrow?

## 2021-09-19 NOTE — PROVIDER NOTIFICATION
MD Notification    Notified Person: MD    Notified Person Name: Nicole    Notification Date/Time: 10:24 PM      Notification Interaction: AMCOM    Purpose of Notification: 507- L.S BG elevated after bedtime insulin.   now. Please advise. Thank you! -Charla [phone]    Orders Received: provider placed orders.     Comments:

## 2021-09-20 LAB
GLUCOSE BLDC GLUCOMTR-MCNC: 144 MG/DL (ref 70–99)
GLUCOSE BLDC GLUCOMTR-MCNC: 160 MG/DL (ref 70–99)
GLUCOSE BLDC GLUCOMTR-MCNC: 162 MG/DL (ref 70–99)
GLUCOSE BLDC GLUCOMTR-MCNC: 60 MG/DL (ref 70–99)
GLUCOSE BLDC GLUCOMTR-MCNC: 66 MG/DL (ref 70–99)
GLUCOSE BLDC GLUCOMTR-MCNC: 66 MG/DL (ref 70–99)
GLUCOSE BLDC GLUCOMTR-MCNC: 74 MG/DL (ref 70–99)
GLUCOSE BLDC GLUCOMTR-MCNC: 87 MG/DL (ref 70–99)
TROPONIN I SERPL-MCNC: <0.015 UG/L (ref 0–0.04)
TROPONIN I SERPL-MCNC: <0.015 UG/L (ref 0–0.04)

## 2021-09-20 PROCEDURE — 120N000001 HC R&B MED SURG/OB

## 2021-09-20 PROCEDURE — 250N000013 HC RX MED GY IP 250 OP 250 PS 637: Performed by: INTERNAL MEDICINE

## 2021-09-20 PROCEDURE — 258N000003 HC RX IP 258 OP 636: Performed by: HOSPITALIST

## 2021-09-20 PROCEDURE — 250N000011 HC RX IP 250 OP 636: Performed by: HOSPITALIST

## 2021-09-20 PROCEDURE — 99233 SBSQ HOSP IP/OBS HIGH 50: CPT | Performed by: INTERNAL MEDICINE

## 2021-09-20 PROCEDURE — 250N000013 HC RX MED GY IP 250 OP 250 PS 637: Performed by: HOSPITALIST

## 2021-09-20 PROCEDURE — 36415 COLL VENOUS BLD VENIPUNCTURE: CPT | Performed by: NURSE PRACTITIONER

## 2021-09-20 PROCEDURE — 84484 ASSAY OF TROPONIN QUANT: CPT | Performed by: NURSE PRACTITIONER

## 2021-09-20 PROCEDURE — 250N000012 HC RX MED GY IP 250 OP 636 PS 637: Performed by: INTERNAL MEDICINE

## 2021-09-20 RX ADMIN — ENOXAPARIN SODIUM 40 MG: 40 INJECTION SUBCUTANEOUS at 21:11

## 2021-09-20 RX ADMIN — IPRATROPIUM BROMIDE AND ALBUTEROL 2 PUFF: 20; 100 SPRAY, METERED RESPIRATORY (INHALATION) at 21:13

## 2021-09-20 RX ADMIN — INSULIN GLARGINE 40 UNITS: 100 INJECTION, SOLUTION SUBCUTANEOUS at 09:45

## 2021-09-20 RX ADMIN — DEXTROSE 15 G: 15 GEL ORAL at 21:17

## 2021-09-20 RX ADMIN — Medication 1 MG: at 00:28

## 2021-09-20 RX ADMIN — IPRATROPIUM BROMIDE AND ALBUTEROL 2 PUFF: 20; 100 SPRAY, METERED RESPIRATORY (INHALATION) at 09:26

## 2021-09-20 RX ADMIN — IPRATROPIUM BROMIDE AND ALBUTEROL 2 PUFF: 20; 100 SPRAY, METERED RESPIRATORY (INHALATION) at 17:49

## 2021-09-20 RX ADMIN — PIOGLITAZONE 45 MG: 45 TABLET ORAL at 09:25

## 2021-09-20 RX ADMIN — DEXTROSE 15 G: 15 GEL ORAL at 21:40

## 2021-09-20 RX ADMIN — INSULIN ASPART 5 UNITS: 100 INJECTION, SOLUTION INTRAVENOUS; SUBCUTANEOUS at 13:26

## 2021-09-20 RX ADMIN — INSULIN ASPART 5 UNITS: 100 INJECTION, SOLUTION INTRAVENOUS; SUBCUTANEOUS at 09:22

## 2021-09-20 RX ADMIN — POLYETHYLENE GLYCOL 3350 17 G: 17 POWDER, FOR SOLUTION ORAL at 09:25

## 2021-09-20 RX ADMIN — INSULIN ASPART 5 UNITS: 100 INJECTION, SOLUTION INTRAVENOUS; SUBCUTANEOUS at 17:49

## 2021-09-20 RX ADMIN — IPRATROPIUM BROMIDE AND ALBUTEROL 2 PUFF: 20; 100 SPRAY, METERED RESPIRATORY (INHALATION) at 13:27

## 2021-09-20 RX ADMIN — ROSUVASTATIN CALCIUM 20 MG: 20 TABLET, FILM COATED ORAL at 09:25

## 2021-09-20 RX ADMIN — SODIUM CHLORIDE 50 ML: 9 INJECTION, SOLUTION INTRAVENOUS at 00:27

## 2021-09-20 ASSESSMENT — ACTIVITIES OF DAILY LIVING (ADL)
ADLS_ACUITY_SCORE: 8
ADLS_ACUITY_SCORE: 9
ADLS_ACUITY_SCORE: 8
ADLS_ACUITY_SCORE: 7
ADLS_ACUITY_SCORE: 7
ADLS_ACUITY_SCORE: 8

## 2021-09-20 NOTE — CODE/RAPID RESPONSE
[facility]    House [facility] RRT Note  9/19/2021   Time Called: 1923    RRT called for: Chest pain    Assessment & Plan     Substernal chest pressure possibly 2/2 MSK.  Alternatively considered PE, ACS, GERD, anxiety, constipation  - With assistance of bilingual, Yakut speaking nursing staff, was able to interview and converse with pt.  Upon arrival, pt sitting upright in bed, awake, alert, in no overt distress, reporting substernal chest pressure.  Pt notes chest pressure developed ~ 1-2 hours prior after eating but worsened after sitting on edge of bed to get up to [facility].  At that time, pt also endorsed nausea and dizziness.  At this time, pt reports reproducible midsternal chest pressure that worsens with inspiratory effort but improved from 6/10 to 3/10 with SL nitroglycerin.  Pt does note she has been having daily BMs since hospital admission but had not had one from 2 days; had a small BM tonight.  Pt reports no indigestion, burning sensation or radiation of pressure.  Pt notes earlier that she felt constipated, requesting miralax; but states to myself she does not feel constipated at this time.  Pt notes her BLE feel more weak; therefore, has been using her BUE and pulling on the bedrails to boost herself up in bed.  Pt's SBP 100s-110s, HR 60s-70s, RR 10s, O2 sats 92-95% on 2-6L O2 via NC (had been on 2L O2 with O2 sats > 92% time of RRT being called but nursing increased O2 to see if helped improve symptoms).      INTERVENTIONS:  - Stat EKG  - Will trial SL nitroglycerin x1 (SBP 90s after first tab; therefore, will not give additional tab at this time)  - Will initiate pt on telemetry monitoring  - Will adjust pt's VS to Q4H  - Stat CXR  - Stat BNP, trop, d-dimer  - Note pt continues on prophylactic lovenox dosing  - If pt continues to endorse reproducible pain, could trial APAP vs lidocaine patch  - If pain persists/increases, has increased O2 needs, develops worsening SOB or new onset  tachycardia, will proceed with stat CT PE study at that time.  At this time, low clinical suspicion for PE, d-dimer negative (continues to improve from previous).    At the end of the RRT pt remains hemodynamically stable, in no apparent distress, reports no further nausea or dizziness and notes chest pressure 3/10.  States does not need additional agents at this time for chest pressure.    Discussed with and defer further cares to nursing and hospitalist     Interval History     Irma Anglin is a 65 year old female who was admitted on 9/10/2021 for cough, suspected COVID.    Medical history significant for: DMII, HLP    Code Status: Special Code    Allergies   Allergies   Allergen Reactions     Codeine      Other reaction(s): Dizziness  Not clear if ever had a rash     Exenatide Nausea and Vomiting     Nausea NO VOMITING     Metformin Nausea and Vomiting     Hydrocodone-Acetaminophen Rash       Physical Exam   Vital Signs with Ranges:  Temp:  [97.9  F (36.6  C)-98.9  F (37.2  C)] 98.9  F (37.2  C)  Pulse:  [65-79] 69  Resp:  [18-26] 26  BP: ()/(47-74) 93/58  SpO2:  [88 %-97 %] 94 %  I/O last 3 completed shifts:  In: 360 [P.O.:360]  Out: -     Constitutional: Pt sitting upright in bed, awake, alert, in no overt distress  Neck: No upper airway wheezes or stridor noted  Pulmonary: In no apparent respiratory distress, minimal air movement noted throughout, no obvious crackles or wheezes noted, no use of accessory muscles noted  Cardiovascular: Regular rate and rhythm, normal S1S2, no murmur, rub or gallop noted  GI: Round, soft, nondistended, nontender to palpation, no guarding or rebound tenderness noted, active bowel sounds  Skin/Integumen: Warm, dry, pink, no mottling noted  Neuro: Awake, alert, PERRL, no focal neuro deficits noted, able to converse with Vincentian speaking nursing staff without difficulty   Psych:  Calm  Extremities: No peripheral edema noted    Data     EKG:  Interpreted by Luis Fernando  LUIZA Vasquez CNP  Time reviewed: 2138  Symptoms at time of EKG: Chest pressure   Rhythm: normal sinus   Rate: Normal  Axis: Normal  Ectopy: none  Conduction: normal  ST Segments/ T Waves: T wave inversion III  Q Waves: none  Comparison to prior: 9/10/21 - previously lead III flattened T wave, today appears minimally inverted; otherwise, unchanged    Clinical Impression: non-specific EKG      Troponin:    Recent Labs   Lab Test 09/19/21 2236   TROPONIN <0.015       IMAGING: (X-ray/CT/MRI)   Recent Results (from the past 24 hour(s))   XR Chest Port 1 View    Narrative    EXAM: XR CHEST PORT 1 VIEW  LOCATION: [facility]  DATE/TIME: 9/19/2021 9:43 PM    INDICATION: COVID +, increased O2 needs  COMPARISON: 9/15/2021      Impression    IMPRESSION: No significant change. Patchy peripheral bilateral pulmonary infiltrates persist in a pattern suggestive of Covid pneumonia. No new lung infiltrate. Heart size remains normal.       Recent Labs   Lab 09/19/21 2236 09/16/21  1520   NTBNPI 40  --    NTBNP  --  105       Recent Labs   Lab 09/19/21 2236   DD 0.50       Time Spent on this Encounter   I spent 40 minutes of critical care time on the unit/floor managing the care of Irma Anglin. Upon evaluation, this patient had a high probability of imminent or life-threatening deterioration due to chest pressure, which required my direct attention, intervention, and personal management. 100% of my time was spent at the bedside counseling the patient and/or coordinating care regarding services listed in this note.    LUIZA Manning [facility]

## 2021-09-20 NOTE — PLAN OF CARE
Patient doing better after c/o chest pain . No increased O2 demands. Denies pain at this time. Voiding well.. blood sugars covered per order. Benedict crackers to be given with melatonin at HS. No SOB. Will continue to monitor.

## 2021-09-20 NOTE — PLAN OF CARE
VSS, sating 93-94%2LNC. Up ind. Denies pain and SOB. Decreased insulin dose and held parameter due to low blood sugar today. Tele reading NSR. A&OX4. Discharge home sometime tomorrow.

## 2021-09-20 NOTE — PROGRESS NOTES
[facility]    Hospitalist Progress Note    Date of Service (when I saw the patient): 09/20/2021  Admit date: 9/10/2021    Assessment & Plan   Irma Anglin is a 65 year old female with PMH diabetes and hyperlipidemia who presents with cough/suspected COVID     Cough, dyspnea  Covid 19 pneumonia  Acute hypoxic respiratory failure  1 week history of cough, nonproductive, afebrile. In ED admission O2 sat 85% on room air, requiring 3 L NC. Chest CT, patchy bilateral GGO; no consolidation. Sodium 126, BUN/CR 9/0.3, glucose 353, ALT/AST 76/82. , LA 1.4. Troponins negative. BNP 32. D-dimer 1.0, chest CT negative for PE. In ED, received dexamethasone IV and remdesivir. At this time no indication of secondary bacterial pneumonia, WBC WNL, afebrile. On 09/20/21 patient reports receiving the Moderna vaccine in the Spring (did not tell children, so family unaware).     - Completed 10 days of decadron 6 mg.  - IV remdesivir started on 9/10,extended on 8/16 for 5 more days & to end on 9/20  - Lovenox 40 mg subcutaneous daily per covid protocol for DVT prophylaxis.   - Weaning off O2 currently good saturations on 2 L on 09/20/21.   - albuterol inhalers scheduled and PRN.   - pulmonary evaluated and added on anoro  - received prn 40mg of lasix on 9/16 & 9/17 with > 4.9.L of output, will give another dose today 9/18.   - check daily weights   - pulmonary consulted outpt pulm follow-up and repeat CT chest in 8-10 weeks, continue anoro once daily  - continue IS     Diabetes type 2, poorly controlled  A1c 5/2021 at 10.2. A1c here is 9.7.   PTA not verified at this time; discrepancy between Endocrinology Note 7/26/2021  unverified PTA medications of Lantus 52 units every morning, 62 units every afternoon, NovoLog 3 times daily at 52 units, 60 units and 35 units with each meal and Actos 45 mg daily.    Recent Labs   Lab 09/20/21  0202 09/19/21  2106 09/19/21  1641 09/19/21  1228 09/19/21  0937  09/19/21  0838   * 251* 253* 176* 157* 58*     - patient continues on 62 units + 12 units glargine. Review of BS shows she has been having recurring BS under 100 in the AM with BS of 67 this AM, with hyperglycemia during the day.   - give gargine 40 units this AM. Then decrease to 50 units in the AM starting tomorrow. Discontinue evening glargine.   - continue novolog 5 units TID + sliding scale insulin, will likely need to increase.   - note she also has novolog per carb count also ordered > discontinued.   - continues on PTA pioglitazone. Watch for fluid overload, daily weights.      Hyponatremia, RESOLVED with hydration.  Recent Labs   Lab 09/19/21  0838 09/18/21  0815 09/17/21  0701    140 136        L labial cyst  - likely bartholin's cyst selling however official gyn consult is pending  - recommend warm compresses for now     Diet: Orders Placed This Encounter      Consistent Carb 60 grams CHO per Meal Diet     IVF: None   Paredes Catheter: Not present     DVT Prophylaxis: Enoxaparin (Lovenox) SQ  Code Status: Special Code     Disposition: Expected discharge 1-2 days to home, likely off O2 tomorrow.   Communication: Discussed with patient and RN on 09/20/21    Emily Metz  Hospitalist Service  [facility]  Securely message with the Vocera Web Console (learn more here)  Text page via [employer] Paging/Directory    Interval History   Events over last 24 hours as outlined above.   Feels fatigued, weak. Breathing stable but still labored.   N/V yesterday, none today. Denies abdominal pain.  Informs me she was vaccinated but did not tell her children, so they were not aware.     -Data reviewed today: I reviewed all new labs and imaging results over the last 24 hours. I personally reviewed no images or EKG's today.    Physical Exam   Temp: 97.4  F (36.3  C) Temp src: Oral BP: 115/61 Pulse: 58   Resp: 22 SpO2: 98 % O2 Device: Oxymizer cannula Oxygen Delivery: 4 LPM  Vitals:     09/12/21 0833   Weight: 89.5 kg (197 lb 5 oz)     Vital Signs with Ranges  Temp:  [97.4  F (36.3  C)-98.9  F (37.2  C)] 97.4  F (36.3  C)  Pulse:  [58-79] 58  Resp:  [20-26] 22  BP: ()/(47-74) 115/61  SpO2:  [88 %-98 %] 98 %  I/O last 3 completed shifts:  In: 370 [P.O.:370]  Out: -     Today's Exam  Constitutional: NAD,   Neuropsyche:  alert and oriented, answers questions appropriately.   Respiratory:  Breathing comfortably, decreased air exchange, no wheezes, no crackles.   Cardiovascular:  Regular rate and rhythm, no edema.  GI:  soft, NT/ND, BS normal  Skin/Integumen:  No acute rash or sign of bleeding.     Medications   All medications reviewed on 09/20/21      - MEDICATION INSTRUCTIONS -       - MEDICATION INSTRUCTIONS -       - MEDICATION INSTRUCTIONS -         enoxaparin ANTICOAGULANT  40 mg Subcutaneous Q24H     insulin aspart  5 Units Subcutaneous TID w/meals     insulin aspart  1-10 Units Subcutaneous TID AC     insulin aspart  1-5 Units Subcutaneous At Bedtime     insulin aspart   Subcutaneous TID AC     insulin glargine  12 Units Subcutaneous At Bedtime     insulin glargine  62 Units Subcutaneous QAM AC     ipratropium-albuterol  2 puff Inhalation 4x Daily     pioglitazone  45 mg Oral Daily     polyethylene glycol  17 g Oral Daily     rosuvastatin  20 mg Oral Daily     sodium chloride (PF)  3 mL Intracatheter Q8H       Data   Recent Labs   Lab 09/20/21  0700 09/20/21  0202 09/19/21  2236 09/19/21  2106 09/19/21  1641 09/19/21  0937 09/19/21  0838 09/18/21  1148 09/18/21  0815 09/17/21  1716 09/17/21  1508 09/17/21  0856 09/17/21  0701 09/16/21  1255 09/16/21  0711 09/15/21  0845 09/15/21  0603   PLT  --   --   --   --   --   --  262  --   --   --   --   --   --   --  213  --   --    NA  --   --   --   --   --   --  136  --  140  --   --   --  136  --   --   --   --    POTASSIUM  --   --   --   --   --   --  3.6  --  3.5  --  4.1   < > 3.1*  --   --   --   --    CHLORIDE  --   --   --   --   --    --  105  --  106  --   --   --  105  --   --   --   --    CO2  --   --   --   --   --   --  27  --  28  --   --   --  27  --   --   --   --    BUN  --   --   --   --   --   --  19  --  20  --   --   --  14  --   --   --   --    CR  --   --   --   --   --   --  0.52  --  0.53  --   --   --  0.47*  --  0.41*   < >  --    ANIONGAP  --   --   --   --   --   --  4  --  6  --   --   --  4  --   --   --   --    GINNA  --   --   --   --   --   --  8.6  --  8.8  --   --   --  8.4*  --   --   --   --    GLC  --  160*  --  251* 253*   < > 58*   < > 82   < >  --    < > 91   < > 98   < >  --    ALBUMIN  --   --   --   --   --   --   --   --   --   --   --   --  2.3*  --   --   --   --    PROTTOTAL  --   --   --   --   --   --   --   --   --   --   --   --  6.7*  --   --   --   --    BILITOTAL  --   --   --   --   --   --   --   --   --   --   --   --  0.5  --   --   --   --    ALKPHOS  --   --   --   --   --   --   --   --   --   --   --   --  84  --   --   --   --    ALT  --   --   --   --   --   --   --   --   --   --   --   --  33  --   --   --  26   AST  --   --   --   --   --   --   --   --   --   --   --   --  41  --   --   --  20   TROPONIN <0.015 <0.015 <0.015  --   --   --   --   --   --   --   --   --   --   --   --   --   --     < > = values in this interval not displayed.       Recent Results (from the past 24 hour(s))   XR Chest Port 1 View    Narrative    EXAM: XR CHEST PORT 1 VIEW  LOCATION: [facility]  DATE/TIME: 9/19/2021 9:43 PM    INDICATION: COVID +, increased O2 needs  COMPARISON: 9/15/2021      Impression    IMPRESSION: No significant change. Patchy peripheral bilateral pulmonary infiltrates persist in a pattern suggestive of Covid pneumonia. No new lung infiltrate. Heart size remains normal.

## 2021-09-20 NOTE — PLAN OF CARE
A&Ox4. 4L O2 sats mid to upper 90s.  No complaints of new or worsening SOB or chest pain during this shift. Tele is Sinus Rhythm. Denies any pain. Patient resting comfortably overnight. Continue to monitor.

## 2021-09-21 LAB
GLUCOSE BLDC GLUCOMTR-MCNC: 156 MG/DL (ref 70–99)
GLUCOSE BLDC GLUCOMTR-MCNC: 157 MG/DL (ref 70–99)
GLUCOSE BLDC GLUCOMTR-MCNC: 173 MG/DL (ref 70–99)
GLUCOSE BLDC GLUCOMTR-MCNC: 230 MG/DL (ref 70–99)
GLUCOSE BLDC GLUCOMTR-MCNC: 60 MG/DL (ref 70–99)
GLUCOSE BLDC GLUCOMTR-MCNC: 90 MG/DL (ref 70–99)
GLUCOSE BLDC GLUCOMTR-MCNC: 92 MG/DL (ref 70–99)

## 2021-09-21 PROCEDURE — 250N000011 HC RX IP 250 OP 636: Performed by: HOSPITALIST

## 2021-09-21 PROCEDURE — 120N000001 HC R&B MED SURG/OB

## 2021-09-21 PROCEDURE — 250N000013 HC RX MED GY IP 250 OP 250 PS 637: Performed by: HOSPITALIST

## 2021-09-21 PROCEDURE — 250N000012 HC RX MED GY IP 250 OP 636 PS 637: Performed by: INTERNAL MEDICINE

## 2021-09-21 PROCEDURE — 99232 SBSQ HOSP IP/OBS MODERATE 35: CPT | Performed by: INTERNAL MEDICINE

## 2021-09-21 PROCEDURE — 258N000001 HC RX 258: Performed by: INTERNAL MEDICINE

## 2021-09-21 PROCEDURE — 250N000013 HC RX MED GY IP 250 OP 250 PS 637: Performed by: INTERNAL MEDICINE

## 2021-09-21 RX ADMIN — ACETAMINOPHEN 650 MG: 325 TABLET, FILM COATED ORAL at 19:58

## 2021-09-21 RX ADMIN — ROSUVASTATIN CALCIUM 20 MG: 20 TABLET, FILM COATED ORAL at 09:00

## 2021-09-21 RX ADMIN — IPRATROPIUM BROMIDE AND ALBUTEROL 2 PUFF: 20; 100 SPRAY, METERED RESPIRATORY (INHALATION) at 12:56

## 2021-09-21 RX ADMIN — Medication 1 MG: at 23:59

## 2021-09-21 RX ADMIN — IPRATROPIUM BROMIDE AND ALBUTEROL 2 PUFF: 20; 100 SPRAY, METERED RESPIRATORY (INHALATION) at 23:38

## 2021-09-21 RX ADMIN — INSULIN ASPART 5 UNITS: 100 INJECTION, SOLUTION INTRAVENOUS; SUBCUTANEOUS at 17:02

## 2021-09-21 RX ADMIN — PIOGLITAZONE 45 MG: 45 TABLET ORAL at 09:00

## 2021-09-21 RX ADMIN — INSULIN ASPART 5 UNITS: 100 INJECTION, SOLUTION INTRAVENOUS; SUBCUTANEOUS at 12:57

## 2021-09-21 RX ADMIN — INSULIN GLARGINE 30 UNITS: 100 INJECTION, SOLUTION SUBCUTANEOUS at 13:26

## 2021-09-21 RX ADMIN — IPRATROPIUM BROMIDE AND ALBUTEROL 2 PUFF: 20; 100 SPRAY, METERED RESPIRATORY (INHALATION) at 17:01

## 2021-09-21 RX ADMIN — ACETAMINOPHEN 650 MG: 325 TABLET, FILM COATED ORAL at 23:59

## 2021-09-21 RX ADMIN — DEXTROSE MONOHYDRATE 25 ML: 500 INJECTION PARENTERAL at 06:23

## 2021-09-21 RX ADMIN — ENOXAPARIN SODIUM 40 MG: 40 INJECTION SUBCUTANEOUS at 21:36

## 2021-09-21 ASSESSMENT — ACTIVITIES OF DAILY LIVING (ADL)
ADLS_ACUITY_SCORE: 8

## 2021-09-21 ASSESSMENT — MIFFLIN-ST. JEOR: SCORE: 1365.5

## 2021-09-21 NOTE — PROGRESS NOTES
[id number]: Pt became COVID RECOVERED this AM. Pt AOx4, VSS on 1L oximask, IND in room. LS: diminished. No c/o LILLY/SOB this AM. No c/o pain, N/V/D. BG this AM was 90- did not receive any scheduled insulin w/ breakfast. Plan to discharge home once weaned off O2.

## 2021-09-21 NOTE — PLAN OF CARE
A&O x 4. Denies pain. Weaned to 1L of O2 sats 95%. Blood sugar low 60s at 2200. Gave 30mg of glucose gel & 4oz of apple juice, resolved to 160. BP soft. BG low, 60 @0600, gave 25 mL dextrose 50% injection; improved to 150. Up IND to BSC. Denies worsening SOB. Denies chest pain, dizziness, lightheadedness. Had BM overnight. Goal to wean off O2 completely. Tele is NSR. BOB. Plan to discharge home at some point today. Continue to monitor.

## 2021-09-21 NOTE — CONSULTS
"Coverage check on GLP-1 agonists and SGLT2 inhibitors.  Patient is uninsured.  Financial counselors have completed an Emergency Medical Assistance (EMA) form with patient's daughter.  Unfortunately, even if approved, EMA does not cover prescriptions unless physician completes a \"care plan\" certifying that the medications are treating a life-threatening condition.    All GLP-1 agonists and SGLT2 inhibitors are in excess of $500/mo, with one exception.  The SGLT2 inhibitor Steglatro is available for $0 on an ongoing basis when using the [employer] discount card.  Upon receipt of RX, Discharge Pharmacy can order this medication for the next business day as well as provide the discount card to patient for further refills.    Alternatively, Humalog and Basaglar insulins are available for $35 each per 30 day supply when using a discount card.      Patient may also consider establishing care with [facility] in [place].  They have an associated pharmacy that may be able to provide these and other medications at low cost.    -YVONNE Estrada, Pharmacy Technician/Liaison, Discharge Pharmacy [phone]    "

## 2021-09-21 NOTE — PROGRESS NOTES
[facility]    Hospitalist Progress Note    Date of Service (when I saw the patient): 09/21/2021  Admit date: 9/10/2021    Assessment & Plan   Irma Anglin is a 65 year old female with PMH diabetes and hyperlipidemia who presents with cough/suspected COVID     Cough, dyspnea  Covid 19 pneumonia  Acute hypoxic respiratory failure  1 week history of cough, nonproductive, afebrile. In ED admission O2 sat 85% on room air, requiring 3 L NC. Chest CT, patchy bilateral GGO; no consolidation. Sodium 126, BUN/CR 9/0.3, glucose 353, ALT/AST 76/82. , LA 1.4. Troponins negative. BNP 32. D-dimer 1.0, chest CT negative for PE. In ED, received dexamethasone IV and remdesivir. At this time no indication of secondary bacterial pneumonia, WBC WNL, afebrile. On 09/20/21 patient informed me she was  vaccinated with Moderna in the Spring (did not tell children, so family unaware).     - Completed 10 days of decadron 6 mg and Remdesivir on 9/19.   - Lovenox 40 mg subcutaneous daily per covid protocol for DVT prophylaxis.   - Weaning off O2 currently on RA at rest. Check O2 with ambulation in prep for discharge.   - Albuterol inhalers scheduled and PRN.   - Received prn 40mg of lasix on 9/16 & 9/17 with > 4.9.L of output, will give another dose today 9/18.   - On 09/21/21, weight stable at 197 #.   - Pulmonary consulted outpt pulm follow-up and repeat CT chest in 8-10 weeks, continue anoro once daily  - Continue IS     Diabetes type 2, poorly controlled  A1c 5/2021 at 10.2. A1c here is 9.7.   Labile sugar with recurring hypoglycemia   PTA not verified at this time; discrepancy between Endocrinology Note 7/26/2021  unverified PTA medications of Lantus 52 units every morning, 62 units every afternoon, NovoLog 3 times daily at 52 units, 60 units and 35 units with each meal and Actos 45 mg daily.    Recent Labs   Lab 09/21/21  1637 09/21/21  1206 09/21/21  0840 09/21/21  0634 09/21/21  0614  09/21/21  0223   * 157* 90 156* 60* 92     - On 9/20/21, patient continued on 62 units + 12 units glargine. Review of BS revealed been having recurring hypoglycemia in the AM with hyperglycemia during the day.   - Gave gargine 40 units this AM of 9/20/21, and discontinued AM dose.   - Continue novolog 5 units TID + sliding scale insulin.   - Stopped novolog per carb count on 9/20/21.  - Continues on PTA pioglitazone. No sign of fluid overload.  - Despite significant decrease in insulin BS on AM of 09/21/21.   - Resumed glargine once BS over 100 at lower dose of 30 units daily.   - Plan for probable discharge tomorrow as long as no further lows.      Hyponatremia, RESOLVED with hydration.  Recent Labs   Lab 09/19/21  0838 09/18/21  0815 09/17/21  0701    140 136        L labial cyst  - likely bartholin's cyst, follow up with gynecology as outpatient.      Diet: Orders Placed This Encounter      Consistent Carb 60 grams CHO per Meal Diet     IVF: None   Paredes Catheter: Not present     DVT Prophylaxis: Enoxaparin (Lovenox) SQ  Code Status: Special Code     Disposition: Expected discharge 1-2 days to home, likely off O2 tomorrow.   Communication: Discussed with patient and RN on 09/20/21    Emily Metz  Hospitalist Service  [facility]  Securely message with the Vocera Web Console (learn more here)  Text page via [employer] Paging/Directory    Interval History   Events over last 24 hours as outlined above.   Feeling relieved to be off oxygen and breathing better. Wants to stop using commode.   Eating without N/V.   BS down as above.     -Data reviewed today: I reviewed all new labs and imaging results over the last 24 hours. I personally reviewed no images or EKG's today.    Physical Exam   Temp: 99.2  F (37.3  C) Temp src: Oral BP: 98/51 Pulse: 82   Resp: 16 SpO2: 95 % O2 Device: None (Room air) Oxygen Delivery: 1 LPM  Vitals:    09/12/21 0833 09/21/21 0635   Weight: 89.5 kg (197 lb  5 oz) 89.4 kg (197 lb 1.5 oz)     Vital Signs with Ranges  Temp:  [98.1  F (36.7  C)-99.2  F (37.3  C)] 99.2  F (37.3  C)  Pulse:  [72-82] 82  Resp:  [16-18] 16  BP: ()/(47-61) 98/51  SpO2:  [92 %-96 %] 95 %  I/O last 3 completed shifts:  In: 200 [P.O.:200]  Out: 900 [Urine:900]    Today's Exam  Constitutional: NAD,   Neuropsyche:  alert and oriented, answers questions appropriately.   Respiratory:  Breathing comfortably, decreased air exchange, no wheezes, no crackles.   Cardiovascular:  Regular rate and rhythm, no edema.  GI:  soft, NT/ND, BS normal  Skin/Integumen:  No acute rash or sign of bleeding.     Medications   All medications reviewed on 09/21/21      - MEDICATION INSTRUCTIONS -       - MEDICATION INSTRUCTIONS -       - MEDICATION INSTRUCTIONS -         enoxaparin ANTICOAGULANT  40 mg Subcutaneous Q24H     insulin aspart  5 Units Subcutaneous TID w/meals     insulin aspart  1-10 Units Subcutaneous TID AC     insulin aspart  1-5 Units Subcutaneous At Bedtime     insulin glargine  30 Units Subcutaneous QAM AC     ipratropium-albuterol  2 puff Inhalation 4x Daily     pioglitazone  45 mg Oral Daily     polyethylene glycol  17 g Oral Daily     rosuvastatin  20 mg Oral Daily     sodium chloride (PF)  3 mL Intracatheter Q8H       Data   Recent Labs   Lab 09/21/21  1637 09/21/21  1206 09/21/21  0840 09/20/21  0818 09/20/21  0700 09/20/21  0202 09/19/21  2236 09/19/21  2106 09/19/21  0937 09/19/21  0838 09/18/21  1148 09/18/21  0815 09/17/21  1716 09/17/21  1508 09/17/21  0856 09/17/21  0701 09/16/21  1255 09/16/21  0711 09/15/21  0845 09/15/21  0603   PLT  --   --   --   --   --   --   --   --   --  262  --   --   --   --   --   --   --  213  --   --    NA  --   --   --   --   --   --   --   --   --  136  --  140  --   --   --  136  --   --   --   --    POTASSIUM  --   --   --   --   --   --   --   --   --  3.6  --  3.5  --  4.1   < > 3.1*  --   --   --   --    CHLORIDE  --   --   --   --   --   --   --    --   --  105  --  106  --   --   --  105  --   --   --   --    CO2  --   --   --   --   --   --   --   --   --  27  --  28  --   --   --  27  --   --   --   --    BUN  --   --   --   --   --   --   --   --   --  19  --  20  --   --   --  14  --   --   --   --    CR  --   --   --   --   --   --   --   --   --  0.52  --  0.53  --   --   --  0.47*  --  0.41*   < >  --    ANIONGAP  --   --   --   --   --   --   --   --   --  4  --  6  --   --   --  4  --   --   --   --    GINNA  --   --   --   --   --   --   --   --   --  8.6  --  8.8  --   --   --  8.4*  --   --   --   --    * 157* 90   < >  --  160*  --    < >   < > 58*   < > 82   < >  --    < > 91   < > 98   < >  --    ALBUMIN  --   --   --   --   --   --   --   --   --   --   --   --   --   --   --  2.3*  --   --   --   --    PROTTOTAL  --   --   --   --   --   --   --   --   --   --   --   --   --   --   --  6.7*  --   --   --   --    BILITOTAL  --   --   --   --   --   --   --   --   --   --   --   --   --   --   --  0.5  --   --   --   --    ALKPHOS  --   --   --   --   --   --   --   --   --   --   --   --   --   --   --  84  --   --   --   --    ALT  --   --   --   --   --   --   --   --   --   --   --   --   --   --   --  33  --   --   --  26   AST  --   --   --   --   --   --   --   --   --   --   --   --   --   --   --  41  --   --   --  20   TROPONIN  --   --   --   --  <0.015 <0.015 <0.015  --   --   --   --   --   --   --   --   --   --   --   --   --     < > = values in this interval not displayed.       No results found for this or any previous visit (from the past 24 hour(s)).

## 2021-09-21 NOTE — PLAN OF CARE
1100- 1500: A&Ox4. Swedish Speaking but understands and speaks little English. VSS on RA, weaned off O2. Tele: SR. Up Independently. Mod CHO diet. . 30 units lantus given as BG have been fluctuating. Denies pain. LS clear. LILLY. Infrequent dry cough. Denies SOB, but anxious that will have difficulty breathing when O2 removed. Voiding bathroom. IV-SL. Discharge pending, plan most likely tomorrow, per Hospitalist will discuss with patient later today. Continue to monitor.

## 2021-09-22 VITALS
HEIGHT: 60 IN | RESPIRATION RATE: 18 BRPM | OXYGEN SATURATION: 96 % | TEMPERATURE: 98.8 F | HEART RATE: 85 BPM | BODY MASS INDEX: 39 KG/M2 | DIASTOLIC BLOOD PRESSURE: 50 MMHG | WEIGHT: 198.63 LBS | SYSTOLIC BLOOD PRESSURE: 105 MMHG

## 2021-09-22 LAB
CREAT SERPL-MCNC: 0.51 MG/DL (ref 0.52–1.04)
GFR SERPL CREATININE-BSD FRML MDRD: >90 ML/MIN/1.73M2
GLUCOSE BLD-MCNC: 129 MG/DL (ref 70–99)
GLUCOSE BLDC GLUCOMTR-MCNC: 151 MG/DL (ref 70–99)
GLUCOSE BLDC GLUCOMTR-MCNC: 153 MG/DL (ref 70–99)
GLUCOSE BLDC GLUCOMTR-MCNC: 278 MG/DL (ref 70–99)
GLUCOSE BLDC GLUCOMTR-MCNC: 297 MG/DL (ref 70–99)
HOLD SPECIMEN: NORMAL
PLATELET # BLD AUTO: 177 10E3/UL (ref 150–450)

## 2021-09-22 PROCEDURE — 99239 HOSP IP/OBS DSCHRG MGMT >30: CPT | Performed by: INTERNAL MEDICINE

## 2021-09-22 PROCEDURE — 82947 ASSAY GLUCOSE BLOOD QUANT: CPT | Performed by: HOSPITALIST

## 2021-09-22 PROCEDURE — 250N000013 HC RX MED GY IP 250 OP 250 PS 637: Performed by: INTERNAL MEDICINE

## 2021-09-22 PROCEDURE — 85049 AUTOMATED PLATELET COUNT: CPT | Performed by: HOSPITALIST

## 2021-09-22 PROCEDURE — 36415 COLL VENOUS BLD VENIPUNCTURE: CPT | Performed by: HOSPITALIST

## 2021-09-22 PROCEDURE — 82565 ASSAY OF CREATININE: CPT | Performed by: HOSPITALIST

## 2021-09-22 PROCEDURE — 250N000012 HC RX MED GY IP 250 OP 636 PS 637: Performed by: INTERNAL MEDICINE

## 2021-09-22 PROCEDURE — XW033E5 INTRODUCTION OF REMDESIVIR ANTI-INFECTIVE INTO PERIPHERAL VEIN, PERCUTANEOUS APPROACH, NEW TECHNOLOGY GROUP 5: ICD-10-PCS | Performed by: INTERNAL MEDICINE

## 2021-09-22 PROCEDURE — 5A0955A ASSISTANCE WITH RESPIRATORY VENTILATION, GREATER THAN 96 CONSECUTIVE HOURS, HIGH NASAL FLOW/VELOCITY: ICD-10-PCS | Performed by: INTERNAL MEDICINE

## 2021-09-22 RX ORDER — SULFAMETHOXAZOLE/TRIMETHOPRIM 800-160 MG
1 TABLET ORAL 2 TIMES DAILY
Qty: 20 TABLET | Refills: 0 | Status: SHIPPED | OUTPATIENT
Start: 2021-09-22 | End: 2021-10-02

## 2021-09-22 RX ORDER — ACETAMINOPHEN 325 MG/1
650 TABLET ORAL EVERY 4 HOURS PRN
COMMUNITY
Start: 2021-09-22

## 2021-09-22 RX ADMIN — IPRATROPIUM BROMIDE AND ALBUTEROL 2 PUFF: 20; 100 SPRAY, METERED RESPIRATORY (INHALATION) at 12:46

## 2021-09-22 RX ADMIN — IPRATROPIUM BROMIDE AND ALBUTEROL 2 PUFF: 20; 100 SPRAY, METERED RESPIRATORY (INHALATION) at 09:03

## 2021-09-22 RX ADMIN — INSULIN GLARGINE 30 UNITS: 100 INJECTION, SOLUTION SUBCUTANEOUS at 09:02

## 2021-09-22 RX ADMIN — ROSUVASTATIN CALCIUM 20 MG: 20 TABLET, FILM COATED ORAL at 08:50

## 2021-09-22 RX ADMIN — PIOGLITAZONE 45 MG: 45 TABLET ORAL at 08:50

## 2021-09-22 RX ADMIN — POLYETHYLENE GLYCOL 3350 17 G: 17 POWDER, FOR SOLUTION ORAL at 08:50

## 2021-09-22 ASSESSMENT — MIFFLIN-ST. JEOR: SCORE: 1372.5

## 2021-09-22 ASSESSMENT — ACTIVITIES OF DAILY LIVING (ADL)
ADLS_ACUITY_SCORE: 8
ADLS_ACUITY_SCORE: 10
ADLS_ACUITY_SCORE: 8
ADLS_ACUITY_SCORE: 8
ADLS_ACUITY_SCORE: 10

## 2021-09-22 NOTE — PLAN OF CARE
3 p to 7 p, Uruguayan speaking,calm and cooperative, VSS on RA, denies pain, SBA , ambulated x 1 to the BR, continent x 2, 1 small BM this shift, slight LILLY,  saturation at 93% at RA after a brief walk to the bathroom.

## 2021-09-22 NOTE — PLAN OF CARE
Date/Time 9/22/21 1500- 0730    Trauma/Ortho/Medical- Medical    Diagnosis: Covid +  POD#: N/A  Mental Status: Alert and Oriented x4  Activity/dangle: up with SBA to BSC, fairly steady on feet  Diet: diabetic diet, oral fluids,  Pain: gave PRN tylenol x1  Paredes/Voiding: voiding without difficulty  Tele/Restraints/Iso: tele SNR  02/LDA: on RA. On continuous pulse ox, IS in room. Scheduled and PRN inhaler. PIV sl  D/C Date: TBD  Other Info: Monitoring respiratory status. CXR today, pulmonary consulted. On subcutaneous lovenox, daily decadron, monitoring blood sugars - 230 and 153       [id number]: A&Ox4. Urdu Speaking but understands and speaks little English. VSS on RA, weaned off O2. Tele: NSR. Up Independently, stand by assist. Continent, up to BR 3 times, reported 1 BM. Mod CHO diet. , 153. 1 unit insulin given. Tylenol given x1 for pain. Melatonin prn given to help with sleep. LS clear but diminished. Denies SOB. IV-SL. Discharge pending, plan most likely tomorrow, per Hospitalist will discuss with patient later today. Continue to monitor.

## 2021-09-22 NOTE — DISCHARGE SUMMARY
[facility]  Hospitalist Discharge Summary      Date of Admission:  9/10/2021  Date of Discharge:  9/22/2021  Discharging Provider: Emily Metz MD      Discharge Diagnoses   Cough, dyspnea  Covid 19 pneumonia  Acute hypoxic respiratory failure  Diabetes type 2, poorly controlled   Labile sugar with recurring hypoglycemia   Hyponatremia, RESOLVED   1 cm abscess to right buttock with surrounding cellulitis    Follow-ups Needed After Discharge   Follow-up Appointments     Follow-up and recommended labs and tests       Follow up with primary care provider, [facility], within 7 days for hospital follow- up.  No follow up labs or test   are needed.  Follow up with gynecology in 1 week with [facility]: ([phone])    Follow up with outpt pulm follow-up and repeat CT chest in 8-10 weeks.   Continue combivent inhaler until that appointment.             Unresulted Labs Ordered in the Past 30 Days of this Admission     No orders found from 8/11/2021 to 9/11/2021.          Discharge Disposition   Discharged to home  Condition at discharge: Good      Hospital Course   Irma Anglin is a 65 year old female with PMH diabetes and hyperlipidemia who presents with cough/suspected COVID     Cough, dyspnea  Covid 19 pneumonia  Acute hypoxic respiratory failure  1 week history of cough, nonproductive, afebrile. In ED admission O2 sat 85% on room air, requiring 3 L NC. Chest CT, patchy bilateral GGO; no consolidation. Sodium 126, BUN/CR 9/0.3, glucose 353, ALT/AST 76/82. , LA 1.4. Troponins negative. BNP 32. D-dimer 1.0, chest CT negative for PE. In ED, received dexamethasone IV and remdesivir. At this time no indication of secondary bacterial pneumonia, WBC WNL, afebrile. On 09/20/21 patient informed me she was  vaccinated with Moderna in the Spring (did not tell children, so family unaware).     - Completed 10 days of decadron 6 mg and Remdesivir on  "9/19.   - Weaned off O2 with good oxygen saturations with ambulation.   - During stay Lovenox 40 mg subcutaneous daily per covid protocol for DVT prophylaxis. > her last d-dimer was normal and IMPROVE score 0. Therefore, not continued at discharge.   - Received prn 40mg of lasix on 9/16 & 9/17 with > 4.9.L of output, will give another dose today 9/18. Weight stable at discharge around 198 pounds without evidence of fluid overload.   - Pulmonary consulted outpt pulm follow-up and repeat CT chest in 8-10 weeks, continue on combivent QID at discharge.      Diabetes type 2, poorly controlled  A1c 5/2021 at 10.2. A1c here is 9.7.   Labile sugar with recurring hypoglycemia   PTA not verified at this time; discrepancy between Endocrinology Note 7/26/2021  unverified PTA medications of Lantus 52 units every morning, 62 units every afternoon, NovoLog 3 times daily at 52 units, 60 units and 35 units with each meal and Actos 45 mg daily.    - When I assumed care on 9/20/21, patient was on 62 units + 12 units glargine and novolog 5 units with meals. Review of BS revealed she had been having recurring hypoglycemia in the AM with hyperglycemia during the day.   - Decreased gargine 40 units AM on 9/20/21, and discontinued HS dose.   - Despite significant decrease in insulin, continued to have low AM blood sugars on 09/21/21. Decreased glargine to 30 units in the AM on 9/21/21.   - Continued on novolog 5 units TID > increased aspart to 10 units TID with meals at discharge + high sensitivity sliding scale insulin due to elevated post prandial BS.  - Discussed the importance of following carb controlled diet as she has done in the hospital.   - Pharmacy reviewed medication costs, to be considered at follow up.      \" All GLP-1 agonists and SGLT2 inhibitors are in excess of $500/mo, with one exception.  The SGLT2 inhibitor Steglatro is available for $0 on an ongoing basis when using the [employer] discount card.  Upon receipt of RX, " "Discharge Pharmacy can order this medication for the next business day as well as provide the discount card to patient for further refills.  Alternatively, Humalog and Basaglar insulins are available for $35 each per 30 day supply when using a discount card.\" (1 month of Basaglar and Humalog provided at discharge)     - Continued on PTA pioglitazone. No sign of fluid overload.     Recent Labs   Lab 09/22/21  1138 09/22/21  0802 09/22/21  0645 09/22/21  0147 09/21/21  2053 09/21/21  1637   * 151* 129* 153* 230* 173*       Hyponatremia, RESOLVED with hydration.    1 cm abscess, right buttock with surrounding cellulitis, spontaneously draining.  - appreciate gynecology consult on 09/22/21.   - started on bactrim x 10 days with recommendation for sitzs baths and warm compresses.   - follow up in one week, info given to schedule appointment.     Communication: Discussed with patient, daughter, OB/GYN and RN on 09/22/21    Emily Metz  Hospitalist Service  [facility]  Securely message with the Vocera Web Console (learn more here)  Text page via [employer] Paging/Directory      Consultations This Hospital Stay   PULMONARY IP CONSULT  GYNECOLOGY IP CONSULT  PHARMACY LIAISON FOR MEDICATION COVERAGE CONSULT    Code Status   Special Code    Time Spent on this Encounter   I, Emily eMtz MD, personally saw the patient today and spent greater than 30 minutes discharging this patient.       Emily Metz MD  [facility] [address]  [address] [place] [address]  Phone: [phone]  ______________________________________________________________________    Physical Exam   Vital Signs: Temp: 98.8  F (37.1  C) Temp src: Oral BP: 105/50 Pulse: 85   Resp: 18 SpO2: 96 % O2 Device: None (Room air)    Weight: 198 lbs 10.15 oz  Constitutional:  NAD,   Neuropsyche:  alert and oriented, answers questions appropriately. Speech normal, face symmetric and moving all 4 " extremities without gross focal neurological deficit.   Respiratory:  Breathing comfortably, good air exchange, no wheezes, no crackles.   Cardiovascular:  Regular rate and rhythm, no edema.  GI:  soft, NT/ND, BS normal  Skin/Integumen:  No acute rash or sign of bleeding. 1 cm area of tenderness with mild induration in the right posterior buttock just outside the labia majora with purulent bloody drainage. Minimal associated erythema.        Primary Care Physician   [facility]    Discharge Orders      COVID-19 GetWell Loop Referral      Reason for your hospital stay    COVID-19 Pneumonia     Contact provider    Contact your primary care provider if If increased trouble breathing, new arm/leg swelling, dizziness/passing out, falls, bleeding that doesn't stop, or uncontrolled pain.     Discharge - Quarantine/Isolation Instruction    Date of symptom onset:     Date of first positive test:    Stay home and away from others (self-isolate) for at least 20 days from when your symptoms started And...   You've had no fevers and no medicine that reduces fever for 1 full day (24 hours)  And...   Your other symptoms have resolved (gotten better).     Activity    Your activity upon discharge: activity as tolerated     Follow-up and recommended labs and tests     Follow up with primary care provider, [facility], within 7 days for hospital follow- up.  No follow up labs or test are needed.  Follow up with outpt pulm follow-up and repeat CT chest in 8-10 weeks.   Continue combivent inhaler until that appointment.     Diet    Follow this diet upon discharge: Low carbohydrate diet: Carb 60 grams CHO per Meal Diet       Significant Results and Procedures   Most Recent 3 CBC's:Recent Labs   Lab Test 09/22/21  0645 09/19/21  0838 09/16/21  0711 09/13/21  0612 09/11/21  0550 09/10/21  1156 09/10/21  1156   WBC  --   --   --   --  5.2  --  5.8   HGB  --   --   --   --  12.5  --  13.1    MCV  --   --   --   --  93  --  91    262 213   < > 157   < > 145*    < > = values in this interval not displayed.     Most Recent 3 BMP's:Recent Labs   Lab Test 09/22/21  1659 09/22/21  1138 09/22/21  0802 09/22/21  0645 09/22/21  0147 09/19/21  0937 09/19/21  0838 09/18/21  1148 09/18/21  0815 09/17/21  1716 09/17/21  1508 09/17/21  0856 09/17/21  0701   NA  --   --   --   --   --   --  136  --  140  --   --   --  136   POTASSIUM  --   --   --   --   --   --  3.6  --  3.5  --  4.1   < > 3.1*   CHLORIDE  --   --   --   --   --   --  105  --  106  --   --   --  105   CO2  --   --   --   --   --   --  27  --  28  --   --   --  27   BUN  --   --   --   --   --   --  19  --  20  --   --   --  14   CR  --   --   --  0.51*  --   --  0.52  --  0.53  --   --    < > 0.47*   ANIONGAP  --   --   --   --   --   --  4  --  6  --   --   --  4   GINNA  --   --   --   --   --   --  8.6  --  8.8  --   --   --  8.4*   * 297* 151* 129*   < >   < > 58*   < > 82   < >  --    < > 91    < > = values in this interval not displayed.     Most Recent 2 LFT's:Recent Labs   Lab Test 09/17/21  0701 09/15/21  0603 09/11/21  0550 09/10/21  1155   AST 41 20   < > 82*   ALT 33 26   < > 76*   ALKPHOS 84  --   --  75   BILITOTAL 0.5  --   --  0.7    < > = values in this interval not displayed.     Most Recent 3 Troponin's:Recent Labs   Lab Test 09/20/21  0700 09/20/21  0202 09/19/21  2236   TROPONIN <0.015 <0.015 <0.015     Most Recent D-dimer:Recent Labs   Lab Test 09/19/21 2236   DD 0.50     Most Recent Hemoglobin A1c:Recent Labs   Lab Test 09/16/21  0711   A1C 9.7*     Most Recent ESR & CRP:Recent Labs   Lab Test 09/16/21  1520 09/11/21  0550 09/10/21  1155   SED  --   --  68*   CRP 22.3*   < > 115.0*    < > = values in this interval not displayed.   ,   Results for orders placed or performed during the hospital encounter of 09/10/21   CT Chest Pulmonary Embolism w Contrast    Narrative    CT CHEST PULMONARY EMBOLISM WITH CONTRAST  9/10/2021 12:57 PM    CLINICAL HISTORY: Hypoxia, COVID, pulmonary embolus.    TECHNIQUE: CT angiogram chest during arterial phase injection IV  contrast. 2D and 3D MIP reconstructions were performed by the CT  technologist. Dose reduction techniques were used.     CONTRAST: 70 mL [id number]    COMPARISON: None.    FINDINGS:  ANGIOGRAM CHEST: Pulmonary arteries are normal caliber and negative  for pulmonary emboli. Thoracic aorta is negative for dissection. No CT  evidence of right heart strain.    LUNGS AND PLEURA: Patchy bilateral groundglass opacities are present.  No airspace consolidation, pneumothorax, or pleural effusion.    MEDIASTINUM/AXILLAE: No pathologically enlarged thoracic or axillary  lymph nodes. Normal caliber thoracic aorta. Small hiatal hernia.    CORONARY ARTERY CALCIFICATION: None.    UPPER ABDOMEN: Diffuse low-attenuation of the liver compatible fatty  infiltration.    MUSCULOSKELETAL: No destructive bone lesions.      Impression    IMPRESSION:  1.  No evidence of pulmonary embolism.    2. Commonly reported imaging features of (COVID-19) pneumonia are  present. Influenza pneumonia, organizing pneumonia, drug toxicity and  connective tissue disease can cause a similar imaging pattern.    PAM SILVA MD         SYSTEM ID:  [id number]   XR Chest Port 1 View    Narrative    CHEST ONE VIEW  9/15/2021 1:27 PM     HISTORY: COVID with persistent hypoxemia    COMPARISON: 6/6/2007      Impression    IMPRESSION: Moderate to severe bilateral infiltrates.    ALLEN DOWELL MD         SYSTEM ID:  [id number]   XR Chest Port 1 View    Narrative    EXAM: XR CHEST PORT 1 VIEW  LOCATION: [facility]  DATE/TIME: 9/19/2021 9:43 PM    INDICATION: COVID +, increased O2 needs  COMPARISON: 9/15/2021      Impression    IMPRESSION: No significant change. Patchy peripheral bilateral pulmonary infiltrates persist in a pattern suggestive of Covid pneumonia. No new lung infiltrate. Heart size remains  normal.       Discharge Medications   Current Discharge Medication List      START taking these medications    Details   acetaminophen (TYLENOL) 325 MG tablet Take 2 tablets (650 mg) by mouth every 4 hours as needed for mild pain Do not exceed 4000 mg per day.    Associated Diagnoses: Pneumonia due to 2019 novel coronavirus      sulfamethoxazole-trimethoprim (BACTRIM DS) 800-160 MG tablet Take 1 tablet by mouth 2 times daily for 10 days  Qty: 20 tablet, Refills: 0    Associated Diagnoses: Cutaneous abscess of buttock         CONTINUE these medications which have CHANGED    Details   !! insulin aspart (NOVOLOG PEN) 100 UNIT/ML pen Inject 1-10 Units Subcutaneous 3 times daily (before meals) Correction Scale - HIGH INSULIN RESISTANCE DOSING   Do Not give Correction Insulin if Pre-Meal BG less than 140. For Pre-Meal  - 164 give 1 unit. For Pre-Meal  - 189 give 2 units. For Pre-Meal  - 214 give 3 units. For Pre-Meal  - 239 give 4 units. For Pre-Meal  - 264 give 5 units. For Pre-Meal  - 289 give 6 units. For Pre-Meal  - 314 give 7 units. For Pre-Meal  - 339 give 8 units. For Pre-Meal  - 364 give 9 units.  For Pre-Meal BG greater than or equal to 365 give 10 units To be given with prandial insulin, and based on pre-meal blood glucose. Notify provider if glucose greater than or equal to 350 mg/dL after administration of correction dose. If given at mealtime, administer within 30 minutes of start of meal  Qty: 10 mL, Refills: 0    Associated Diagnoses: Type 2 diabetes mellitus with hyperglycemia, with long-term current use of insulin (H)      !! insulin aspart (NOVOLOG PEN) 100 UNIT/ML pen Inject 10 Units Subcutaneous 3 times daily (with meals)  Qty: 10 mL, Refills: 0    Associated Diagnoses: Type 2 diabetes mellitus with hyperglycemia, with long-term current use of insulin (H)      insulin glargine (LANTUS PEN) 100 UNIT/ML pen Inject 30 Units Subcutaneous every morning  (before breakfast)  Qty: 9 mL, Refills: 0    Comments: If Lantus is not covered by insurance, may substitute Basaglar at same dose and frequency.    Associated Diagnoses: Type 2 diabetes mellitus with hyperglycemia, with long-term current use of insulin (H)      ipratropium-albuterol (COMBIVENT RESPIMAT)  MCG/ACT inhaler Inhale 2 puffs into the lungs 4 times daily (please give her unused inhaler from hospital stay)  Qty: 4 g, Refills: 0    Associated Diagnoses: Type 2 diabetes mellitus with hyperglycemia, with long-term current use of insulin (H)       !! - Potential duplicate medications found. Please discuss with provider.      CONTINUE these medications which have NOT CHANGED    Details   melatonin 1 MG TABS tablet Take 1 tablet (1 mg) by mouth nightly as needed for sleep (Take 2 hours before bedtime)  Qty: 30 tablet, Refills: 0    Associated Diagnoses: Insomnia, unspecified type      pioglitazone (ACTOS) 45 MG tablet Take 45 mg by mouth daily      rosuvastatin (CRESTOR) 20 MG tablet Take 20 mg by mouth daily         STOP taking these medications       insulin aspart (NOVOLOG VIAL) 100 UNITS/ML vial Comments:   Reason for Stopping:             Allergies   Allergies   Allergen Reactions     Codeine      Other reaction(s): Dizziness  Not clear if ever had a rash     Exenatide Nausea and Vomiting     Nausea NO VOMITING     Metformin Nausea and Vomiting     Hydrocodone-Acetaminophen Rash

## 2021-09-22 NOTE — PLAN OF CARE
Patient is ready for discharge, but after talking today, I discover that she is still having pain in R posterior vulva with bloody drainage. Examination reveals mild tenderness and induration in the R posterior vulva with scant bloody purulent drainage on her undergarments. No purulent discharge expressed with palpation.     I see this was discovered on 9/16 and gynecology consult placed but never completed.   Discussed with charge nurse to call gynecology so they can assess patient prior to discharge.   Thank you.

## 2021-09-23 NOTE — PROGRESS NOTES
Pt a/o x 4. Bengali speaking. VSS on RA. Dyspenia on exertion. AVS given to patient and educated utilizing  for communication. Used teach back method. Medications given to patient and belongings returned. All questions answered. Discharged home.

## 2021-09-24 LAB
ATRIAL RATE - MUSE: 66 BPM
DIASTOLIC BLOOD PRESSURE - MUSE: NORMAL MMHG
INTERPRETATION ECG - MUSE: NORMAL
P AXIS - MUSE: 34 DEGREES
PR INTERVAL - MUSE: 122 MS
QRS DURATION - MUSE: 74 MS
QT - MUSE: 436 MS
QTC - MUSE: 457 MS
R AXIS - MUSE: 0 DEGREES
SYSTOLIC BLOOD PRESSURE - MUSE: NORMAL MMHG
T AXIS - MUSE: 18 DEGREES
VENTRICULAR RATE- MUSE: 66 BPM